# Patient Record
Sex: FEMALE | Race: WHITE | Employment: PART TIME | URBAN - METROPOLITAN AREA
[De-identification: names, ages, dates, MRNs, and addresses within clinical notes are randomized per-mention and may not be internally consistent; named-entity substitution may affect disease eponyms.]

---

## 2018-12-25 ENCOUNTER — HOSPITAL ENCOUNTER (EMERGENCY)
Facility: CLINIC | Age: 38
Discharge: ANOTHER HOSPITAL | End: 2018-12-26
Attending: EMERGENCY MEDICINE | Admitting: EMERGENCY MEDICINE
Payer: COMMERCIAL

## 2018-12-25 ENCOUNTER — APPOINTMENT (OUTPATIENT)
Dept: CT IMAGING | Facility: CLINIC | Age: 38
End: 2018-12-25
Attending: EMERGENCY MEDICINE
Payer: COMMERCIAL

## 2018-12-25 VITALS
DIASTOLIC BLOOD PRESSURE: 99 MMHG | WEIGHT: 194 LBS | TEMPERATURE: 97.9 F | HEART RATE: 94 BPM | SYSTOLIC BLOOD PRESSURE: 147 MMHG | OXYGEN SATURATION: 97 % | RESPIRATION RATE: 18 BRPM

## 2018-12-25 DIAGNOSIS — I77.71 CAROTID ARTERY DISSECTION (H): ICD-10-CM

## 2018-12-25 LAB
ANION GAP SERPL CALCULATED.3IONS-SCNC: 5 MMOL/L (ref 3–14)
B-HCG FREE SERPL-ACNC: <5 IU/L
BASOPHILS # BLD AUTO: 0.1 10E9/L (ref 0–0.2)
BASOPHILS NFR BLD AUTO: 0.7 %
BUN SERPL-MCNC: 19 MG/DL (ref 7–30)
CALCIUM SERPL-MCNC: 8.5 MG/DL (ref 8.5–10.1)
CHLORIDE SERPL-SCNC: 111 MMOL/L (ref 94–109)
CO2 SERPL-SCNC: 27 MMOL/L (ref 20–32)
COHGB MFR BLD: 1.1 % (ref 0–2)
CREAT SERPL-MCNC: 0.85 MG/DL (ref 0.52–1.04)
DIFFERENTIAL METHOD BLD: NORMAL
EOSINOPHIL # BLD AUTO: 0.1 10E9/L (ref 0–0.7)
EOSINOPHIL NFR BLD AUTO: 1.4 %
ERYTHROCYTE [DISTWIDTH] IN BLOOD BY AUTOMATED COUNT: 12.3 % (ref 10–15)
GFR SERPL CREATININE-BSD FRML MDRD: 85 ML/MIN/{1.73_M2}
GLUCOSE SERPL-MCNC: 94 MG/DL (ref 70–99)
HCT VFR BLD AUTO: 39.3 % (ref 35–47)
HGB BLD-MCNC: 12.6 G/DL (ref 11.7–15.7)
IMM GRANULOCYTES # BLD: 0 10E9/L (ref 0–0.4)
IMM GRANULOCYTES NFR BLD: 0.1 %
LYMPHOCYTES # BLD AUTO: 2.5 10E9/L (ref 0.8–5.3)
LYMPHOCYTES NFR BLD AUTO: 35 %
MCH RBC QN AUTO: 29.2 PG (ref 26.5–33)
MCHC RBC AUTO-ENTMCNC: 32.1 G/DL (ref 31.5–36.5)
MCV RBC AUTO: 91 FL (ref 78–100)
MONOCYTES # BLD AUTO: 0.7 10E9/L (ref 0–1.3)
MONOCYTES NFR BLD AUTO: 9.7 %
NEUTROPHILS # BLD AUTO: 3.7 10E9/L (ref 1.6–8.3)
NEUTROPHILS NFR BLD AUTO: 53.1 %
NRBC # BLD AUTO: 0 10*3/UL
NRBC BLD AUTO-RTO: 0 /100
PLATELET # BLD AUTO: 355 10E9/L (ref 150–450)
POTASSIUM SERPL-SCNC: 3.8 MMOL/L (ref 3.4–5.3)
RBC # BLD AUTO: 4.32 10E12/L (ref 3.8–5.2)
SODIUM SERPL-SCNC: 143 MMOL/L (ref 133–144)
WBC # BLD AUTO: 7 10E9/L (ref 4–11)

## 2018-12-25 PROCEDURE — 96375 TX/PRO/DX INJ NEW DRUG ADDON: CPT

## 2018-12-25 PROCEDURE — 85025 COMPLETE CBC W/AUTO DIFF WBC: CPT | Performed by: EMERGENCY MEDICINE

## 2018-12-25 PROCEDURE — 25000128 H RX IP 250 OP 636: Performed by: EMERGENCY MEDICINE

## 2018-12-25 PROCEDURE — 84702 CHORIONIC GONADOTROPIN TEST: CPT

## 2018-12-25 PROCEDURE — 82375 ASSAY CARBOXYHB QUANT: CPT | Performed by: EMERGENCY MEDICINE

## 2018-12-25 PROCEDURE — 96374 THER/PROPH/DIAG INJ IV PUSH: CPT | Mod: 59

## 2018-12-25 PROCEDURE — 70450 CT HEAD/BRAIN W/O DYE: CPT

## 2018-12-25 PROCEDURE — 70498 CT ANGIOGRAPHY NECK: CPT

## 2018-12-25 PROCEDURE — 25000132 ZZH RX MED GY IP 250 OP 250 PS 637: Performed by: EMERGENCY MEDICINE

## 2018-12-25 PROCEDURE — 96361 HYDRATE IV INFUSION ADD-ON: CPT

## 2018-12-25 PROCEDURE — 99285 EMERGENCY DEPT VISIT HI MDM: CPT | Mod: 25

## 2018-12-25 PROCEDURE — 36415 COLL VENOUS BLD VENIPUNCTURE: CPT | Performed by: EMERGENCY MEDICINE

## 2018-12-25 PROCEDURE — 80048 BASIC METABOLIC PNL TOTAL CA: CPT | Performed by: EMERGENCY MEDICINE

## 2018-12-25 RX ORDER — LEVOTHYROXINE SODIUM 100 UG/1
100 TABLET ORAL DAILY
Status: ON HOLD | COMMUNITY
End: 2018-12-26

## 2018-12-25 RX ORDER — IOPAMIDOL 755 MG/ML
500 INJECTION, SOLUTION INTRAVASCULAR ONCE
Status: COMPLETED | OUTPATIENT
Start: 2018-12-25 | End: 2018-12-25

## 2018-12-25 RX ORDER — DIPHENHYDRAMINE HYDROCHLORIDE 50 MG/ML
12.5 INJECTION INTRAMUSCULAR; INTRAVENOUS ONCE
Status: COMPLETED | OUTPATIENT
Start: 2018-12-25 | End: 2018-12-25

## 2018-12-25 RX ORDER — BUPROPION HYDROCHLORIDE 100 MG/1
100 TABLET ORAL 2 TIMES DAILY
Status: ON HOLD | COMMUNITY
End: 2018-12-26

## 2018-12-25 RX ORDER — HYDROCHLOROTHIAZIDE 12.5 MG/1
12.5 CAPSULE ORAL EVERY MORNING
COMMUNITY

## 2018-12-25 RX ORDER — METOCLOPRAMIDE HYDROCHLORIDE 5 MG/ML
10 INJECTION INTRAMUSCULAR; INTRAVENOUS ONCE
Status: COMPLETED | OUTPATIENT
Start: 2018-12-25 | End: 2018-12-25

## 2018-12-25 RX ORDER — CLOPIDOGREL BISULFATE 75 MG/1
300 TABLET ORAL ONCE
Status: COMPLETED | OUTPATIENT
Start: 2018-12-25 | End: 2018-12-25

## 2018-12-25 RX ORDER — SODIUM CHLORIDE 9 MG/ML
1000 INJECTION, SOLUTION INTRAVENOUS CONTINUOUS
Status: DISCONTINUED | OUTPATIENT
Start: 2018-12-25 | End: 2018-12-26 | Stop reason: HOSPADM

## 2018-12-25 RX ORDER — KETOROLAC TROMETHAMINE 30 MG/ML
30 INJECTION, SOLUTION INTRAMUSCULAR; INTRAVENOUS ONCE
Status: COMPLETED | OUTPATIENT
Start: 2018-12-25 | End: 2018-12-25

## 2018-12-25 RX ORDER — ASPIRIN 325 MG
325 TABLET ORAL ONCE
Status: COMPLETED | OUTPATIENT
Start: 2018-12-25 | End: 2018-12-25

## 2018-12-25 RX ADMIN — SODIUM CHLORIDE 1000 ML: 9 INJECTION, SOLUTION INTRAVENOUS at 21:59

## 2018-12-25 RX ADMIN — CLOPIDOGREL BISULFATE 300 MG: 75 TABLET, FILM COATED ORAL at 23:00

## 2018-12-25 RX ADMIN — IOPAMIDOL 70 ML: 755 INJECTION, SOLUTION INTRAVENOUS at 22:08

## 2018-12-25 RX ADMIN — SODIUM CHLORIDE 100 ML: 9 INJECTION, SOLUTION INTRAVENOUS at 22:08

## 2018-12-25 RX ADMIN — DIPHENHYDRAMINE HYDROCHLORIDE 12.5 MG: 50 INJECTION INTRAMUSCULAR; INTRAVENOUS at 22:00

## 2018-12-25 RX ADMIN — METOCLOPRAMIDE 10 MG: 5 INJECTION, SOLUTION INTRAMUSCULAR; INTRAVENOUS at 22:00

## 2018-12-25 RX ADMIN — ASPIRIN 325 MG ORAL TABLET 325 MG: 325 PILL ORAL at 23:00

## 2018-12-25 RX ADMIN — KETOROLAC TROMETHAMINE 30 MG: 30 INJECTION, SOLUTION INTRAMUSCULAR at 21:59

## 2018-12-25 ASSESSMENT — ENCOUNTER SYMPTOMS
CHILLS: 0
NUMBNESS: 0
HEADACHES: 1
FEVER: 0
SPEECH DIFFICULTY: 0
FACIAL ASYMMETRY: 1
WEAKNESS: 0

## 2018-12-26 ENCOUNTER — APPOINTMENT (OUTPATIENT)
Dept: MRI IMAGING | Facility: CLINIC | Age: 38
DRG: 301 | End: 2018-12-26
Attending: INTERNAL MEDICINE
Payer: COMMERCIAL

## 2018-12-26 ENCOUNTER — HOSPITAL ENCOUNTER (INPATIENT)
Facility: CLINIC | Age: 38
LOS: 1 days | Discharge: HOME | DRG: 301 | End: 2018-12-27
Attending: INTERNAL MEDICINE | Admitting: INTERNAL MEDICINE
Payer: COMMERCIAL

## 2018-12-26 DIAGNOSIS — I77.71 CAROTID ARTERY DISSECTION (H): Primary | ICD-10-CM

## 2018-12-26 PROCEDURE — 25000128 H RX IP 250 OP 636: Performed by: INTERNAL MEDICINE

## 2018-12-26 PROCEDURE — 25500064 ZZH RX 255 OP 636: Performed by: INTERNAL MEDICINE

## 2018-12-26 PROCEDURE — 99221 1ST HOSP IP/OBS SF/LOW 40: CPT | Performed by: PSYCHIATRY & NEUROLOGY

## 2018-12-26 PROCEDURE — 12000000 ZZH R&B MED SURG/OB

## 2018-12-26 PROCEDURE — 25000132 ZZH RX MED GY IP 250 OP 250 PS 637: Performed by: INTERNAL MEDICINE

## 2018-12-26 PROCEDURE — 70553 MRI BRAIN STEM W/O & W/DYE: CPT

## 2018-12-26 PROCEDURE — A9585 GADOBUTROL INJECTION: HCPCS | Performed by: INTERNAL MEDICINE

## 2018-12-26 PROCEDURE — 99207 ZZC APP CREDIT; MD BILLING SHARED VISIT: CPT | Performed by: INTERNAL MEDICINE

## 2018-12-26 PROCEDURE — 99222 1ST HOSP IP/OBS MODERATE 55: CPT | Mod: AI | Performed by: INTERNAL MEDICINE

## 2018-12-26 RX ORDER — LEVOTHYROXINE SODIUM 100 UG/1
100 TABLET ORAL DAILY
Status: DISCONTINUED | OUTPATIENT
Start: 2018-12-26 | End: 2018-12-26

## 2018-12-26 RX ORDER — AMOXICILLIN 250 MG
1 CAPSULE ORAL 2 TIMES DAILY PRN
Status: DISCONTINUED | OUTPATIENT
Start: 2018-12-26 | End: 2018-12-27 | Stop reason: HOSPADM

## 2018-12-26 RX ORDER — ONDANSETRON 4 MG/1
4 TABLET, ORALLY DISINTEGRATING ORAL EVERY 6 HOURS PRN
Status: DISCONTINUED | OUTPATIENT
Start: 2018-12-26 | End: 2018-12-27 | Stop reason: HOSPADM

## 2018-12-26 RX ORDER — CLOPIDOGREL BISULFATE 75 MG/1
75 TABLET ORAL DAILY
Status: DISCONTINUED | OUTPATIENT
Start: 2018-12-26 | End: 2018-12-27 | Stop reason: HOSPADM

## 2018-12-26 RX ORDER — BUPROPION HYDROCHLORIDE 100 MG/1
100 TABLET ORAL 2 TIMES DAILY
Status: DISCONTINUED | OUTPATIENT
Start: 2018-12-26 | End: 2018-12-26

## 2018-12-26 RX ORDER — AMOXICILLIN 250 MG
2 CAPSULE ORAL 2 TIMES DAILY PRN
Status: DISCONTINUED | OUTPATIENT
Start: 2018-12-26 | End: 2018-12-27 | Stop reason: HOSPADM

## 2018-12-26 RX ORDER — BUPROPION HYDROCHLORIDE 100 MG/1
100 TABLET, EXTENDED RELEASE ORAL 2 TIMES DAILY
Status: DISCONTINUED | OUTPATIENT
Start: 2018-12-26 | End: 2018-12-27 | Stop reason: HOSPADM

## 2018-12-26 RX ORDER — HYDROCHLOROTHIAZIDE 12.5 MG/1
12.5 CAPSULE ORAL DAILY
Status: DISCONTINUED | OUTPATIENT
Start: 2018-12-26 | End: 2018-12-27 | Stop reason: HOSPADM

## 2018-12-26 RX ORDER — ONDANSETRON 2 MG/ML
4 INJECTION INTRAMUSCULAR; INTRAVENOUS EVERY 6 HOURS PRN
Status: DISCONTINUED | OUTPATIENT
Start: 2018-12-26 | End: 2018-12-27 | Stop reason: HOSPADM

## 2018-12-26 RX ORDER — LEVOTHYROXINE SODIUM 100 UG/1
100 TABLET ORAL ONCE
Status: COMPLETED | OUTPATIENT
Start: 2018-12-26 | End: 2018-12-26

## 2018-12-26 RX ORDER — ASPIRIN 325 MG
325 TABLET ORAL DAILY
Status: DISCONTINUED | OUTPATIENT
Start: 2018-12-26 | End: 2018-12-27 | Stop reason: HOSPADM

## 2018-12-26 RX ORDER — HYDRALAZINE HYDROCHLORIDE 20 MG/ML
10 INJECTION INTRAMUSCULAR; INTRAVENOUS EVERY 4 HOURS PRN
Status: DISCONTINUED | OUTPATIENT
Start: 2018-12-26 | End: 2018-12-26

## 2018-12-26 RX ORDER — BUPROPION HYDROCHLORIDE 100 MG/1
100 TABLET, EXTENDED RELEASE ORAL 2 TIMES DAILY
COMMUNITY

## 2018-12-26 RX ORDER — ACETAMINOPHEN 325 MG/1
650 TABLET ORAL EVERY 4 HOURS PRN
Status: DISCONTINUED | OUTPATIENT
Start: 2018-12-26 | End: 2018-12-27 | Stop reason: HOSPADM

## 2018-12-26 RX ORDER — HYDROCODONE BITARTRATE AND ACETAMINOPHEN 5; 325 MG/1; MG/1
1-2 TABLET ORAL EVERY 4 HOURS PRN
Status: DISCONTINUED | OUTPATIENT
Start: 2018-12-26 | End: 2018-12-27 | Stop reason: HOSPADM

## 2018-12-26 RX ORDER — GADOBUTROL 604.72 MG/ML
9 INJECTION INTRAVENOUS ONCE
Status: COMPLETED | OUTPATIENT
Start: 2018-12-26 | End: 2018-12-26

## 2018-12-26 RX ORDER — LEVOTHYROXINE SODIUM 175 UG/1
175 TABLET ORAL
COMMUNITY

## 2018-12-26 RX ORDER — SODIUM CHLORIDE 9 MG/ML
INJECTION, SOLUTION INTRAVENOUS CONTINUOUS
Status: DISCONTINUED | OUTPATIENT
Start: 2018-12-26 | End: 2018-12-27 | Stop reason: HOSPADM

## 2018-12-26 RX ORDER — LEVOTHYROXINE SODIUM 100 UG/1
200 TABLET ORAL
Status: DISCONTINUED | OUTPATIENT
Start: 2018-12-28 | End: 2018-12-27 | Stop reason: HOSPADM

## 2018-12-26 RX ORDER — HYDRALAZINE HYDROCHLORIDE 20 MG/ML
10 INJECTION INTRAMUSCULAR; INTRAVENOUS EVERY 4 HOURS PRN
Status: DISCONTINUED | OUTPATIENT
Start: 2018-12-26 | End: 2018-12-27 | Stop reason: HOSPADM

## 2018-12-26 RX ORDER — LORAZEPAM 0.5 MG/1
.5-1 TABLET ORAL EVERY 4 HOURS PRN
Status: DISCONTINUED | OUTPATIENT
Start: 2018-12-26 | End: 2018-12-27 | Stop reason: HOSPADM

## 2018-12-26 RX ORDER — LEVOTHYROXINE SODIUM 200 UG/1
200 TABLET ORAL
COMMUNITY

## 2018-12-26 RX ORDER — NALOXONE HYDROCHLORIDE 0.4 MG/ML
.1-.4 INJECTION, SOLUTION INTRAMUSCULAR; INTRAVENOUS; SUBCUTANEOUS
Status: DISCONTINUED | OUTPATIENT
Start: 2018-12-26 | End: 2018-12-27 | Stop reason: HOSPADM

## 2018-12-26 RX ADMIN — BUPROPION HYDROCHLORIDE 100 MG: 100 TABLET, FILM COATED, EXTENDED RELEASE ORAL at 20:42

## 2018-12-26 RX ADMIN — SODIUM CHLORIDE: 9 INJECTION, SOLUTION INTRAVENOUS at 02:57

## 2018-12-26 RX ADMIN — BUPROPION HYDROCHLORIDE 100 MG: 100 TABLET, FILM COATED ORAL at 09:11

## 2018-12-26 RX ADMIN — ASPIRIN 325 MG ORAL TABLET 325 MG: 325 PILL ORAL at 09:11

## 2018-12-26 RX ADMIN — HYDROCHLOROTHIAZIDE 12.5 MG: 12.5 CAPSULE ORAL at 09:11

## 2018-12-26 RX ADMIN — LEVOTHYROXINE SODIUM 100 MCG: 100 TABLET ORAL at 11:56

## 2018-12-26 RX ADMIN — LEVOTHYROXINE SODIUM 100 MCG: 100 TABLET ORAL at 09:11

## 2018-12-26 RX ADMIN — SODIUM CHLORIDE: 9 INJECTION, SOLUTION INTRAVENOUS at 20:43

## 2018-12-26 RX ADMIN — LORAZEPAM 1 MG: 0.5 TABLET ORAL at 04:21

## 2018-12-26 RX ADMIN — CLOPIDOGREL BISULFATE 75 MG: 75 TABLET, FILM COATED ORAL at 09:11

## 2018-12-26 RX ADMIN — GADOBUTROL 9 ML: 604.72 INJECTION INTRAVENOUS at 05:42

## 2018-12-26 ASSESSMENT — ACTIVITIES OF DAILY LIVING (ADL)
TOILETING: 0-->INDEPENDENT
RETIRED_COMMUNICATION: 0-->UNDERSTANDS/COMMUNICATES WITHOUT DIFFICULTY
ADLS_ACUITY_SCORE: 8
SWALLOWING: 0-->SWALLOWS FOODS/LIQUIDS WITHOUT DIFFICULTY
AMBULATION: 0-->INDEPENDENT
COGNITION: 0 - NO COGNITION ISSUES REPORTED
TRANSFERRING: 0-->INDEPENDENT
ADLS_ACUITY_SCORE: 8
RETIRED_EATING: 0-->INDEPENDENT
ADLS_ACUITY_SCORE: 8
DRESS: 0-->INDEPENDENT
FALL_HISTORY_WITHIN_LAST_SIX_MONTHS: NO
BATHING: 0-->INDEPENDENT
ADLS_ACUITY_SCORE: 8
ADLS_ACUITY_SCORE: 8

## 2018-12-26 NOTE — PLAN OF CARE
A&Ox4. Neuros intact, except slight L eye droop and R tongue deviation. VSS. Regular diet. Voiding adequately. Up with SBA. Denies pain or headache. Plan to discharge to home tomorrow if pt remains stable, nursing will continue to monitor.

## 2018-12-26 NOTE — PROVIDER NOTIFICATION
"Neurology fellow paged, \"Is pt okay to have diet orders? No interventions at this time? Thank you.\"    ADDENDUM: Neurology called back, then Dr. Hoover paged, \"Spoke w/ neurology. Pt okay to have diet orders, can you place whichever you prefer she have? They want to watch her one more night then likely discharge tomorrow morning if she stays stable.\"  "

## 2018-12-26 NOTE — PROGRESS NOTES
Pt seen and examined. Seen by my colleague Dr. Ramos early in the morning. H/P, labs, vital signs and orders reviewed. Agree with his A/P.  Patient presenting with headache and left eye ptosis and miosis, was eventually found to have left ICA dissection.    Headache much improved since admission.  No new neurological change.  Patient seen by stroke neurology, recommend continuing dual antiplatelet agents with aspirin 325 mg daily and Plavix 75 mg daily  Every 4 neurochecks  Monitor for next 24 hours if stable likely discharge home tomorrow.  Reassess in AM

## 2018-12-26 NOTE — ED PROVIDER NOTES
History     Chief Complaint:  Headache    HPI   Juli Davis is a 38 year old female with a history of hypertension, controlled with medications who presents with a headache and droopy eyelid. The patient states she first noticed a slight headache on Thursday, 5 days ago, which she attributed to stress as she is planning a trip to [place] for six months coming up here. She describes the headache as pressure-like along the left base of her head and into her left ear. She reports the headache persisted throughout the weekend, but seemed to be eased by Advil. However, on Sunday, 2 days ago, the patient states she arrived in [place] from [place] where she lives for the holidays and her mother commented on her left eye drooping slightly. The patient reports she did not notice it much at that time, but started to notice the droopy left eye yesterday as it was more pronounced. Today, the patient states her headache was totally alleviated by Advil until later this evening when it returned. She also noted her eyelid to be more droopy than previously, so she called her primary care physician in [place] who suggested she be seen in an ED, prompting her visit this evening. Here in the ED, she notes some pain radiating into her left ear from the headache, but denies any numbness, weakness, speech or gait difficulty, visual changes, significant ear pain, tinnitus, or other acute symptoms. She denies any history of migraines.    Allergies:  Cephalexin  Macrobid  Penicillins    Medications:    Wellbutrin  Hydrochlorothiazide  Levothyroxine    Past Medical History:    Hypothyroidism  Hypertension  Depression    Past Surgical History:    Nasal surgery  Dental surgery  Thyroidectomy    Family History:    Hypertension  Multiple food allergies    Social History:  Smoking status: No  Alcohol use: Yes, socially  Drug use: No  Patient lives in [place], [place]  Presents to the ED with her spouse  Marital Status:      Review of Systems   Constitutional: Negative for chills and fever.   HENT: Negative for ear pain and tinnitus.    Eyes: Negative for visual disturbance.   Musculoskeletal: Negative for gait problem.   Neurological: Positive for facial asymmetry (Left ptosis) and headaches. Negative for speech difficulty, weakness and numbness.   All other systems reviewed and are negative.    Physical Exam     Patient Vitals for the past 24 hrs:   BP Temp Temp src Pulse Heart Rate Resp SpO2 Weight   12/25/18 2130 (!) 153/103 -- -- 85 -- -- -- --   12/25/18 2115 -- -- -- -- -- -- 98 % --   12/25/18 2100 (!) 141/98 -- -- 81 -- -- 98 % --   12/25/18 2016 (!) 170/115 97.9  F (36.6  C) Temporal -- 93 18 100 % 88 kg (194 lb 0.1 oz)     Physical Exam  Constitutional:  Appears well-developed and well-nourished. Alert. Conversant. Non toxic.  HENT:   Head: Atraumatic. No depressed skull fracture, Racoon Eyes, Carpio's sign, or hemotympanum. Face normal. TMs normal  Nose: Nose normal.  Mouth/Throat: Oral mucosa is clear and moist. no trismus. Pharynx normal. Tonsils symmetric. No tonsillar enlargement, erythema, or exudate.  Eyes: Conjunctivae normal. EOM normal. Pupils equal, round, and reactive to light. No scleral icterus.   Neck: Normal range of motion. Neck supple. No tracheal deviation present. But no carotid bruit.  Cardiovascular: Normal rate, regular rhythm. No gallop. No friction rub. No murmur heard. Symmetric radial artery pulses   Pulmonary/Chest: Effort normal. No stridor. No respiratory distress. No wheezes. No rales. No rhonchi . No tenderness.   Abdominal: Soft. Bowel sounds normal. No distension. No mass. No tenderness. No rebound. No guarding.   Musculoskeletal:   RUE: Normal range of motion. No tenderness. No deformity  LUE: Normal range of motion. No tenderness. No deformity  RLE: Normal range of motion. No edema. No tenderness. No deformity  LLE: Normal range of motion. No edema. No tenderness. No  deformity  Lymph: No cervical adenopathy.   Neurological: Alert and oriented to person, place, and time. Mental status normal. Attention normal.  Alert and oriented x3.  GCS 15. Memory normal. Speech fluent. Cognition normal.  Facial sensation and muscles of facial expression are normal save for subtle left ptosis.  Tongue does deviate slightly to the right when it protrudes.  By my exam palate elevates symmetrically and uvula is in the midline.  Normal phonation.  EOMI. Palate elevates symmetrically and tongue protrudes in the midline.    Strength:   5/5 bilaterally in the trapezius, 5/5 bilaterally in the deltoid, 5/5 bilaterally in the biceps, 5/5 bilaterally in the triceps, 5/5 bilaterally in thegrip, 5/5 bilaterally thumb opposition, 5/5 bilaterally finger abduction  5/5 bilaterally in the psoas, 5/5 bilaterally in the quadriceps, 5/5 bilaterally in the hamstring, 5/5 bilaterally in the gastrocnemius, 5/5 bilaterally in the tibialis anterior    Sensation intact to light touch in both upper extremities (C4-T1)  Sensation intact to light touch in Both lower extremities (L4-S1).     Finger to nose and coordination normal. Gait normal.   Skin: Skin is warm and dry. No rash noted. No pallor. Normal capillary refill.  Psychiatric:  Normal mood. Normal affect.     Emergency Department Course   Imaging:  Radiographic findings were communicated with the patient who voiced understanding of the findings.    Head CT w/o contrast:  Normal CT scan of the head.   As read by Radiology.    CT Head Neck Angio w/o & w Contrast:  Left distal cervical internal carotid artery dissection  with 80-90% short segment stenosis.  As read by Radiology.    Laboratory:  CBC: WNL (WBC 7.0, HGB 12.6, )  BMP: Chloride 111 (H), o/w WNL (Creatinine 0.85)  Carbon monoxide: 1.1  ISTAT hCG quant: <5.0    Interventions:  2159: NS 1L IV Bolus  2159: 30 mg Toradol IV  2200: 12.5 mg Benadryl IV  2200: 10 mg Reglan IV  2300: 325 mg Aspirin  PO  2300: 300 mg Plavix PO    Emergency Department Course:  Past medical records, nursing notes, and vitals reviewed.  2101: I performed an exam of the patient and obtained history, as documented above.  IV inserted and blood drawn.  The patient was sent for a head CT and head/neck CTA while in the emergency department, findings above.    2235: I spoke with Dr. Sparks on for radiology regarding the CT findings.    2239: I spoke to Dr. Cole on-call for stroke neurology.     2244: I rechecked the patient. Explained findings to the patient.    2258: I spoke to Dr. Ramos of the hospitalist service who accepts the patient for admission.     Findings and plan explained to the patient. Patient will be transferred to [facility] via EMS. Discussed the case with Dr. Ramos, who will admit the patient to a monitored bed for further monitoring, evaluation, and treatment.     Impression & Plan    Medical Decision Making:  Juli Davis is a 38 year old female who presents to the ED for evaluation of headache for the past 4-5 days associated with left eye ptosis that was first noticed 2 days ago by her mother but is more prominent yesterday and today.  On my exam in addition to those symptoms she also had very subtle left eye miosis.  With a potential left Ismael syndrome and a new headache syndrome, I was concerned about carotid artery dissection.  We sent the patient for head CT which was fortunately negative for hemorrhage, mass or other abnormality and for CTA of her head and neck which did confirm a left carotid dissection with an 80-90% stenosis of a short segment of her distal ICA.  Other than a very subtle tongue deviation there is no other obvious stroke symptoms, no a aphasia or right hemiparesis.  Discussed with the stroke neurologist who recommended dual antiplatelet therapy with aspirin and Plavix (300 mg initial loading dose).  No recent unusual bleeding or other risk factors for hemorrhage.   Initial antiplatelet medications given while here in the ER.  He also recommends MRI of the brain and 24-hour observation.  If she were to develop stroke symptoms she may be a candidate for intra-arterial intervention.  Therefore will transfer to [facility] where the stroke service and the neuro interventionalists are located to expedite care should become necessary.    Discussed the lab and CT findings, the diagnosis, the recommended treatment plan by the stroke neurologists with the patient and her .  They are both in full agreement.  I then discussed with the hospitalist service at [facility] who accepted the patient in transfer to the neuro unit.  She will transfer by EMS.    Critical Care time: none    Diagnosis:    ICD-10-CM   1. Carotid artery dissection (H) I77.71     Disposition: Transferred and admitted to [facility]    Nivia Camacho  12/25/2018   [facility] EMERGENCY DEPARTMENT    Nivia CARMICHAEL, am serving as a scribe at 9:01 PM on 12/25/2018 to document services personally performed by Bandar Moe MD based on my observations and the provider's statements to me.      Bandar Moe MD  12/25/18 [id number]

## 2018-12-26 NOTE — PLAN OF CARE
A&O X4. Neuros intact ex L eye droop, slight r tongue deviation. VSS. Passed bedside swallow- oral ativan given prior to MRI. Pt NPO ex meds at this time until further eval. Up SBA. Denies headache/pain. Discharge plan pending.

## 2018-12-26 NOTE — CONSULTS
[facility], [facility]    Vascular Neurology consult    Name: Juli Davis  YOB: 1980  MRN: [id number]  Today's date: 12/26/2018  Source of information: Patient, patient's  at bedside and chart review    Reason for consult:  Left ICA dissection    Chief Complaint:  Headache for 5 days, left eye ptosis.    History of Present Illness:    Juli Davis is a 38 year old female with a PMH significant for hypertension, hypothyroidism, depression who presented to the ED for further evaluation of a 5-day history of headaches and left eye ptosis.  Approximately 5 days ago, patient noted severe left-sided headache which she initially attributed to stress.  She denies any prior trauma, no chiropractic manipulation, no sudden strenuous activity prior to this.  She was taking over-the-counter medications with minimal relief.  When she was with her family for [place] holiday, 1 of her family members noted left eye ptosis and she presented to the ED for further evaluation.  On arrival, she had a CT head without contrast which was a normal study, she had a CT angiogram head and neck which showed left distal cervical ICA dissection with 80-90% short segment stenosis.  She was then admitted for further workup and was started on aspirin and Plavix.  MRI brain did not show any evidence of an acute infarct.  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  No recent fevers or chills, no chest pain or shortness of breath, no abdominal pain nausea vomiting or diarrhea, no strenuous lifting or activity.  Currently denies any headaches.    The patient's medical, surgical, social, and family history were personally reviewed with the patient.  No past medical history on file.   Past Surgical History:   Procedure Laterality Date     NONSPECIFIC PROCEDURE      NASAL SURG AS A CHILD     NONSPECIFIC PROCEDURE      DENTAL SURG     Social History      Socioeconomic History     Marital status:      Spouse name: Not on file     Number of children: Not on file     Years of education: Not on file     Highest education level: Not on file   Social Needs     Financial resource strain: Not on file     Food insecurity - worry: Not on file     Food insecurity - inability: Not on file     Transportation needs - medical: Not on file     Transportation needs - non-medical: Not on file   Occupational History     Not on file   Tobacco Use     Smoking status: Never Smoker   Substance and Sexual Activity     Alcohol use: Yes     Comment: rare socially     Drug use: No     Sexual activity: Not on file   Other Topics Concern     Not on file   Social History Narrative     Not on file     Family History   Problem Relation Age of Onset     Hypertension Father      Allergies Mother         to many foods     Current Facility-Administered Medications   Medication     acetaminophen (TYLENOL) tablet 650 mg     aspirin (ASA) tablet 325 mg     buPROPion (WELLBUTRIN SR) 12 hr tablet 100 mg     clopidogrel (PLAVIX) tablet 75 mg     hydrALAZINE (APRESOLINE) injection 10 mg     hydrochlorothiazide (MICROZIDE) capsule 12.5 mg     HYDROcodone-acetaminophen (NORCO) 5-325 MG per tablet 1-2 tablet     levothyroxine (SYNTHROID/LEVOTHROID) tablet 100 mcg     [START ON 12/27/2018] levothyroxine (SYNTHROID/LEVOTHROID) tablet 175 mcg     [START ON 12/28/2018] levothyroxine (SYNTHROID/LEVOTHROID) tablet 200 mcg     LORazepam (ATIVAN) tablet 0.5-1 mg     magnesium hydroxide (MILK OF MAGNESIA) suspension 30 mL     melatonin tablet 1 mg     naloxone (NARCAN) injection 0.1-0.4 mg     ondansetron (ZOFRAN-ODT) ODT tab 4 mg    Or     ondansetron (ZOFRAN) injection 4 mg     senna-docusate (SENOKOT-S/PERICOLACE) 8.6-50 MG per tablet 1 tablet    Or     senna-docusate (SENOKOT-S/PERICOLACE) 8.6-50 MG per tablet 2 tablet     sodium chloride 0.9% infusion     Allergies   Allergen Reactions     Cephalexin       Macrobid [Nitrofurantoin]      Penicillins      Review of Systems:  14-point review of systems was completed. The pertinent positives and negatives are in the HPI, and the remainder was negative unless otherwise mentioned.    Physical Exam:  /83   Pulse 86   Temp 98.4  F (36.9  C) (Oral)   Resp 16   SpO2 99%    General:  Pleasant. Dressed appropriately for season. Resting comfortably in bed.   Head:  Normocephalic, atraumatic  Eyes:  No conjunctival injection, no scleral icterus.   Mouth:  No oral lesions, no erythema or exudate in the oropharynx  Respiratory:  Non-labored breathing on room air. No accessory muscle use.  Cardiovascular:  Peripheral pulses intact. No carotid bruits.  Abdomen: Soft  Extremities:  Warm, dry without peripheral edema  Neurologic:    Mental Status Exam:  Alert, awake. Fully oriented. Provides a thorough history. Speech of normal fluency.  Cranial Nerves:  EOMs intact, no nystagmus, left eye ptosis and myosis was noted, otherwise face is symmetric, facial sensation intact to light touch, hearing intact to conversation, palate and uvula rise symmetrically, no deviation in uvula or tongue, symmetric shoulder shrug, tongue midline and fully mobile.   Motor:  Normal tone in all four extremities, no atrophy or fasciculations were seen. 5/5 strength bilaterally in shoulder abduction, elbow extensors and flexors, wrist extension, finger abduction, hip flexors, knee extensors and flexors. No tremors.  Sensory:  Sensation intact to light touch on arms and legs bilaterally. Negative Romberg.  Coordination:  Finger-nose-finger without dysmetria bilaterally. Finger tapping regular and rhythmic bilaterally.   Reflexes:  2+ and symmetric  Gait: Deferred     National [employer] Stroke Scale  Exam Interval: Baseline   Score    Level of consciousness: (0)   Alert, keenly responsive    LOC questions: (0)   Answers both questions correctly    LOC commands: (0)   Performs both tasks  correctly    Best gaze: (0)   Normal    Visual: (0)   No visual loss    Facial palsy: (0)   Normal symmetrical movements    Motor arm (left): (0)   No drift    Motor arm (right): (0)   No drift    Motor leg (left): (0)   No drift    Motor leg (right): (0)   No drift    Limb ataxia: (0)   Absent    Sensory: (0)   Normal- no sensory loss    Best language: (0)   Normal- no aphasia    Dysarthria: (0)   Normal    Extinction and inattention: (0)   No abnormality        Total Score:  0     laboratory:  Lab Results   Component Value Date    WBC 7.0 12/25/2018    HGB 12.6 12/25/2018    HCT 39.3 12/25/2018     12/25/2018     12/25/2018    POTASSIUM 3.8 12/25/2018    CHLORIDE 111 (H) 12/25/2018    CO2 27 12/25/2018    BUN 19 12/25/2018    CR 0.85 12/25/2018    GLC 94 12/25/2018    AST 28 04/22/2003    ALT 14 04/22/2003    ALKPHOS 55 04/22/2003    BILITOTAL 0.6 04/22/2003     Imaging:  CT head-no acute intracranial abnormality  CT angiogram head and neck- left distal ICA dissection with 80-90% stenosis  MRI brain-no evidence of an acute infarct    Assessment/Plan:  Juli Davis is a 38 year old female with a past medical history of hypertension who presented with a 5-day history of headaches and left eye ptosis and myosis.  She was noted to have a distal left ICA dissection with 80-90% stenosis.  Unclear etiology of her dissection at this time.  No clear history of trauma or neck manipulation.    #Left ICA dissection with 80-90% stenosis  -Continue dual antiplatelets: Aspirin 325 mg + Plavix 75 mg daily  -Q4 neuro checks  -She will need repeat imaging: CT angiogram head and neck in 4 weeks.  -Patient will be out of the country for the next 6 months, recommended that she get close follow-up with repeat imaging and a neurology consult.  -Would recommend to continue dual antiplatelets until follow-up imaging to decide regarding duration of medications.  -Stroke education  -Okay for long distance travel, we did  discuss regarding avoiding strenuous activity or neck manipulation    #Hypertension  -Long-term blood pressure less than 140/90    #Depression  -Continue Wellbutrin 100 twice daily    DVT prophylaxis: SCDs, subcu heparin  Disposition: Monitor for another 24 hours, possible discharge to home on 12/27    Plan was discussed in detail with patient and patient's  at bedside who expressed understanding. We will continue to follow along closely, please call us with any questions or concerns. Case was seen and discussed with Dr. Kelsey Oliveros MD  Vascular Neurology fellow  Pager # [phone]

## 2018-12-26 NOTE — PROVIDER NOTIFICATION
"Paged Dr. Ramos, \"Wondering if you want MRI ordered for pt w/ Ativan? Also, wondering about diet order- Current order NPO.\"  "

## 2018-12-26 NOTE — ED TRIAGE NOTES
Headache since Thursday, on Sunday night headache got a little better but now eyelid droop on lt eye.  No other neuro deficits, still mild headache.

## 2018-12-26 NOTE — PHARMACY-ADMISSION MEDICATION HISTORY
Admission medication history interview status for the 12/26/2018  admission is complete. See EPIC admission navigator for prior to admission medications     Medication history source reliability:Good    Actions taken by pharmacist (provider contacted, etc):None     Additional medication history information not noted on PTA med list :None    Medication reconciliation/reorder completed by provider prior to medication history? No    Time spent in this activity: 15 min    Prior to Admission medications    Medication Sig Last Dose Taking? Auth Provider   buPROPion (WELLBUTRIN SR) 100 MG 12 hr tablet Take 100 mg by mouth 2 times daily 12/25/2018 at am Yes Unknown, Entered By History   levothyroxine (SYNTHROID/LEVOTHROID) 175 MCG tablet Take 175 mcg by mouth three times a week Tu / Th / Sa 12/25/2018 at am Yes Unknown, Entered By History   levothyroxine (SYNTHROID/LEVOTHROID) 200 MCG tablet Take 200 mcg by mouth four times a week Mon, Wed, Fri, [place] 12/24/2018 at am Yes Unknown, Entered By History   hydrochlorothiazide (MICROZIDE) 12.5 MG capsule Take 12.5 mg by mouth every morning  12/24/2018 at pm  Reported, Patient

## 2018-12-26 NOTE — H&P
[facility]    History and Physical  Hospitalist       Date of Admission:  12/26/2018  Date of Service (when I saw the patient): 12/26/18    Assessment & Plan   Juli Davis is a 38 year old female who presents with L sided headache and ptosis    L internal carotid artery dissection  Presented after having several days of headache on L as well as ptosis. In ED imaging revealed L internal carotid artery dissection with 80-90% short segment stenosis. Neuro exam intact with exception of L eyelid ptosis  - Neurology consulted/ aware  - q4 hour neuro checks  - MRI/MRA brain  - plavix loaded, continue plavix, ASA    Hypertension  PTA on hydrochlorothiazide  - continue HCTZ  - prn hydralazine for SBP >170     Hypothyroidism  Continue pta levothyroxine    Depression  Normally on bupropion 100 mg BID, hold until seen by neurology    Pain plan: # Pain Assessment:  Current Pain Score 12/26/2018   Patient currently in pain? denies   Pain score (0-10) -   Juli giron pain level was assessed and she currently denies pain.      DVT Prophylaxis: Pneumatic Compression Devices and Ambulate every shift  Code Status: Full Code    Disposition: Expected discharge in 1-2 days pending neurology input    Reece Ramos MD  [phone] (P)  Text Page     Primary Care Physician   In [place], not local    Chief Complaint   L ptosis, L sided headache    History is obtained from the patient and medical records    History of Present Illness   Juli Davis is a 38 year old female who presents with the above. With a history of hypertension, depression and hypothyroidism. States that ~5 days prior to admission with L sided headache, which she attributed to stress (she and her  are going to be in [place] for 6 months). In [place] for Christmas holiday, and family member noted L ptosis. Given ongoing headache and ptosis, she presented to the ED for evaluation. Denies f/c. No cp/sob. No other focal neurologic  deficits. No GI or  sx    In ED noted to have L ptosis, ? L eye miosis as well.  Found on CT imaging to have L internal carotid dissection with 80-90% stenosis. Mildly hypertensive to 140-150 systolic. Labs otherwise normal    Past Medical History    I have reviewed this patient's medical history and updated it with pertinent information if needed.   No past medical history on file.    Past Surgical History   I have reviewed this patient's surgical history and updated it with pertinent information if needed.  Past Surgical History:   Procedure Laterality Date     NONSPECIFIC PROCEDURE      NASAL SURG AS A CHILD     NONSPECIFIC PROCEDURE      DENTAL SURG       Prior to Admission Medications   Prior to Admission Medications   Prescriptions Last Dose Informant Patient Reported? Taking?   buPROPion (WELLBUTRIN) 100 MG tablet   Yes No   Sig: Take 100 mg by mouth 2 times daily   hydrochlorothiazide (MICROZIDE) 12.5 MG capsule   Yes No   Sig: Take 12.5 mg by mouth daily   levothyroxine (SYNTHROID/LEVOTHROID) 100 MCG tablet   Yes No   Sig: Take 100 mcg by mouth daily      Facility-Administered Medications: None     Allergies   Allergies   Allergen Reactions     Cephalexin      Macrobid [Nitrofurantoin]      Penicillins        Social History   I have reviewed this patient's social history and updated it with pertinent information if needed. Juli Davis  reports that  has never smoked. She does not have any smokeless tobacco history on file. She reports that she drinks alcohol. She reports that she does not use drugs.    Family History   I have reviewed this patient's family history and updated it with pertinent information if needed.   Family History   Problem Relation Age of Onset     Hypertension Father      Allergies Mother         to many foods       Review of Systems   The 10 point Review of Systems is negative other than noted in the HPI or here.     Physical Exam   Temp: 98.4  F (36.9  C) Temp src: Oral BP:  (!) 147/99   Heart Rate: 86 Resp: 16 SpO2: 99 % O2 Device: None (Room air)    Vital Signs with Ranges  0 lbs 0 oz    Constitutional: alert, oriented and in no acute distress  Eyes: EOMI, PERRL, L upper eyelid ptosis  HEENT: OP clear  Respiratory: CTA B without w/c  Cardiovascular: RRR without m/r/g  GI: soft, nontender, nondistended, no HSM  Lymph/Hematologic: no cervical LAD  Genitourinary: deferred  Skin: no rashes or lesions grossly  Musculoskeletal: no deformities or arthritis  Neurologic: CN II-XII, RONQUILLO, sensation grossly intact  Psychiatric: mood and affect wnl    Data   Data reviewed today:  I personally reviewed no images or EKG's today.  Recent Labs   Lab 12/25/18  2135   WBC 7.0   HGB 12.6   MCV 91         POTASSIUM 3.8   CHLORIDE 111*   CO2 27   BUN 19   CR 0.85   ANIONGAP 5   PANDA 8.5   GLC 94       Recent Results (from the past 24 hour(s))   CT Head Neck Angio w/o & w Contrast    Narrative    CTA HEAD/NECK WITH CONTRAST December 25, 2018 10:18 PM     HISTORY: Headache, sudden, carotid/vertebral dissection suspected.  Neuro deficit(s), subacute; left ptosis, left headache.    TECHNIQUE: Axial images were obtained through the head and neck  without and with intravenous contrast. 70 mL of Isovue-370 was given.  Multiplanar reconstructions were performed. 3-D reconstructions off a  remote workstation for CT angiography were also acquired. Carotid  stenoses were evaluated by comparing the caliber of the proximal  internal carotid artery to the caliber of the distal internal carotid  artery. Radiation dose for this scan was reduced using automated  exposure control, adjustment of the mA and/or kV according to patient  size, or iterative reconstruction technique.    FINDINGS:    Brachiocephalic vessels: Normal.    Right carotid system: Normal.    Left carotid system: There is a distal cervical internal carotid  artery dissection at the skull base with approximately 80-90%  stenosis.    Right  vertebral artery: Normal.    Left vertebral artery: Normal.    [place]: Normal.    Other findings: None.      Impression    IMPRESSION: Left distal cervical internal carotid artery dissection  with 80-90% short segment stenosis.    PERLITA RUBI MD   CT Head w/o Contrast    Narrative    CT SCAN OF THE HEAD WITHOUT CONTRAST December 25, 2018 10:31 PM     HISTORY: Headache, left ptosis, tongue deviation.    TECHNIQUE: Axial images of the head and coronal reformations without  IV contrast material. Radiation dose for this scan was reduced using  automated exposure control, adjustment of the mA and/or kV according  to patient size, or iterative reconstruction technique.    COMPARISON: None.    FINDINGS: The ventricles are normal in size, shape and configuration.  The brain parenchyma and subarachnoid spaces are normal. There is no  evidence of intracranial hemorrhage, mass, acute infarct or anomaly.     The visualized portions of the sinuses and mastoids appear normal.  There is no evidence of trauma.       Impression    IMPRESSION: Normal CT scan of the head.     PERLITA RUBI MD

## 2018-12-26 NOTE — PLAN OF CARE
Pt A&O x4. VSS on ra. Pt denies headache. Regular diet. UP with SBA. LS clear. Neuro intact. BS active. Will continue to monitor.

## 2018-12-27 VITALS
RESPIRATION RATE: 16 BRPM | OXYGEN SATURATION: 96 % | WEIGHT: 194.6 LBS | TEMPERATURE: 99.1 F | DIASTOLIC BLOOD PRESSURE: 99 MMHG | SYSTOLIC BLOOD PRESSURE: 135 MMHG | HEART RATE: 86 BPM

## 2018-12-27 PROCEDURE — 25000132 ZZH RX MED GY IP 250 OP 250 PS 637: Performed by: INTERNAL MEDICINE

## 2018-12-27 PROCEDURE — 99232 SBSQ HOSP IP/OBS MODERATE 35: CPT | Performed by: PSYCHIATRY & NEUROLOGY

## 2018-12-27 PROCEDURE — 25000128 H RX IP 250 OP 636: Performed by: INTERNAL MEDICINE

## 2018-12-27 PROCEDURE — 99238 HOSP IP/OBS DSCHRG MGMT 30/<: CPT | Performed by: INTERNAL MEDICINE

## 2018-12-27 RX ORDER — CLOPIDOGREL BISULFATE 75 MG/1
75 TABLET ORAL DAILY
Qty: 30 TABLET | Refills: 5 | Status: SHIPPED | OUTPATIENT
Start: 2018-12-27 | End: 2019-01-26

## 2018-12-27 RX ORDER — ASPIRIN 325 MG
325 TABLET ORAL DAILY
Qty: 30 TABLET | Refills: 5 | Status: SHIPPED | OUTPATIENT
Start: 2018-12-27 | End: 2018-12-27

## 2018-12-27 RX ORDER — ASPIRIN 325 MG
325 TABLET ORAL DAILY
Qty: 30 TABLET | Refills: 5 | Status: SHIPPED | OUTPATIENT
Start: 2018-12-27 | End: 2019-01-26

## 2018-12-27 RX ADMIN — LEVOTHYROXINE SODIUM 175 MCG: 100 TABLET ORAL at 07:58

## 2018-12-27 RX ADMIN — ASPIRIN 325 MG ORAL TABLET 325 MG: 325 PILL ORAL at 07:58

## 2018-12-27 RX ADMIN — HYDROCHLOROTHIAZIDE 12.5 MG: 12.5 CAPSULE ORAL at 07:58

## 2018-12-27 RX ADMIN — BUPROPION HYDROCHLORIDE 100 MG: 100 TABLET, FILM COATED, EXTENDED RELEASE ORAL at 07:58

## 2018-12-27 RX ADMIN — CLOPIDOGREL BISULFATE 75 MG: 75 TABLET, FILM COATED ORAL at 07:57

## 2018-12-27 RX ADMIN — SODIUM CHLORIDE: 9 INJECTION, SOLUTION INTRAVENOUS at 05:56

## 2018-12-27 RX ADMIN — ACETAMINOPHEN 650 MG: 325 TABLET, FILM COATED ORAL at 07:57

## 2018-12-27 ASSESSMENT — ACTIVITIES OF DAILY LIVING (ADL)
ADLS_ACUITY_SCORE: 8

## 2018-12-27 NOTE — PROGRESS NOTES
Vascular Neurology Progress Note    ____________________________________________________________    Admission Summary:  Ms. Davis is a 38 year old woman who presents with left eye ptosis and headache. She was found to have severe left ICA cervical-petrous segment dissection.    12/27 Left eye ptosis and miosis improved.    Last 24 hours:      As above.    Impression:    Left ICA dissection, likely traumatic     Recommendations:     -Continue on Aspirin and Plavix  -Will need CTA head in 1 month for follow up and determine need for anti-platelet therapy  -She will be flying soon to [place] and I think it would be ok for her to fly; She will need to make sure she follows up with Neurologist or follow up electronically through us  -Discharge home    Stroke Education provided including signs/symptoms of a stroke and the importance of timely treatment.    Please contact the stroke service with any questions: Feel free to call my cellphone.    Rafael Cole MD  Vascular Neurology  December 27, 2018    I personally reviewed all relevant labs and neuroimaging.  I spent 35 minutes reviewing labs, diagnostic studies, neuroimaging, and evaluating the patient.    ____________________________________________________________________        Medications:      Current Facility-Administered Medications:      acetaminophen (TYLENOL) tablet 650 mg, 650 mg, Oral, Q4H PRN, Reece Ramos MD, 650 mg at 12/27/18 0757     aspirin (ASA) tablet 325 mg, 325 mg, Oral, Daily, Reece Ramos MD, 325 mg at 12/27/18 0758     buPROPion (WELLBUTRIN SR) 12 hr tablet 100 mg, 100 mg, Oral, BID, Gray Hoover MD, 100 mg at 12/27/18 0758     clopidogrel (PLAVIX) tablet 75 mg, 75 mg, Oral, Daily, Reece Ramos MD, 75 mg at 12/27/18 0757     hydrALAZINE (APRESOLINE) injection 10 mg, 10 mg, Intravenous, Q4H PRN, Reece Ramos MD     hydrochlorothiazide (MICROZIDE) capsule 12.5 mg, 12.5 mg, Oral, Daily,  Reece Ramos MD, 12.5 mg at 12/27/18 0758     HYDROcodone-acetaminophen (NORCO) 5-325 MG per tablet 1-2 tablet, 1-2 tablet, Oral, Q4H PRN, Reece Ramos MD     levothyroxine (SYNTHROID/LEVOTHROID) tablet 175 mcg, 175 mcg, Oral, Once per day on Tue Thu Sat, Gray Hoover MD, 175 mcg at 12/27/18 0758     [START ON 12/28/2018] levothyroxine (SYNTHROID/LEVOTHROID) tablet 200 mcg, 200 mcg, Oral, Once per day on Sun Mon Wed Fri, Gray Hoover MD     LORazepam (ATIVAN) tablet 0.5-1 mg, 0.5-1 mg, Oral, Q4H PRN, Reece Ramos MD, 1 mg at 12/26/18 0421     magnesium hydroxide (MILK OF MAGNESIA) suspension 30 mL, 30 mL, Oral, Daily PRN, Reece Ramos MD     melatonin tablet 1 mg, 1 mg, Oral, At Bedtime PRN, Reece Ramos MD     naloxone (NARCAN) injection 0.1-0.4 mg, 0.1-0.4 mg, Intravenous, Q2 Min PRN, Reece Ramos MD     ondansetron (ZOFRAN-ODT) ODT tab 4 mg, 4 mg, Oral, Q6H PRN **OR** ondansetron (ZOFRAN) injection 4 mg, 4 mg, Intravenous, Q6H PRN, Reece Ramos MD     senna-docusate (SENOKOT-S/PERICOLACE) 8.6-50 MG per tablet 1 tablet, 1 tablet, Oral, BID PRN **OR** senna-docusate (SENOKOT-S/PERICOLACE) 8.6-50 MG per tablet 2 tablet, 2 tablet, Oral, BID PRN, Reece Ramos MD     sodium chloride 0.9% infusion, , Intravenous, Continuous, Reece Ramos MD, Stopped at 12/27/18 1030    Vital Signs:  B/P: 135/99, T: 99.1, P: 86, R: 16    BP (!) 135/99 (BP Location: Left arm)   Pulse 86   Temp 99.1  F (37.3  C) (Oral)   Resp 16   Wt 88.3 kg (194 lb 9.6 oz)   SpO2 96%   General: Awake and alert, not in any acute distress, cooperative  HEENT: Atraumatic, normocephalic, no scleral icterus or conjunctival pallor   Cardiac: RRR  Chest: Clear to auscultation  Abdomen: Soft, non-tender, non-distended  Extremities: No LE swelling.  Skin: No rash or lesion.   Psych: Normal mood and affect     Neuro:  Mental status: Awake,  alert, attentive, oriented x3. Speech is fluent, comprehension and repetition intact. No dysarthria.  Good historian.  No neglect.  Cranial nerves:  Pupils equal and reactive.  EOMI, visual fields full, face symmetric, facial sensation intact, shoulder shrug strong, palate rise symmetric, tongue/uvula midline, hearing intact to conversation.  Motor: Tone normal. 5/5 strength in all 4 extremities.  Reflexes: Symmetric  Sensory: Intact to light touch, temp.    Coordination: Finger nose finger intact bilaterally, no dysmetria, normal heel-shin test bilaterally    Labs/Studies:  CBC:     Recent Labs   Lab 12/25/18  2135   WBC 7.0   RBC 4.32   HGB 12.6   HCT 39.3        Basic Metabolic Panel:   Recent Labs   Lab Test 12/25/18  2135      POTASSIUM 3.8   CHLORIDE 111*   CO2 27   BUN 19   CR 0.85   GLC 94   PANDA 8.5     Liver panel:  No lab results found.  INR:No lab results found.   Lipid Profile:No lab results found.  A1C: No lab results found.  Troponin I: No lab results found.      Imaging:  Relevant findings as per the Impression above.

## 2018-12-27 NOTE — PLAN OF CARE
A&Ox4. Neuros intact, except L eye droop and R tongue deviation. VSS. Regular diet. Up Ind. C/o mild headache, managed w/ tylenol. Plan to discharge home w/ spouse. Imaging CD and records given to pt at discharge.

## 2018-12-27 NOTE — DISCHARGE SUMMARY
[facility]    Discharge Summary  Hospitalist    Date of Admission:  12/26/2018  Date of Discharge:  12/27/2018  Discharging Provider: Gray Hoover MD    Discharge Diagnoses     Spontaneous left internal carotid artery dissection  Hypertension  Hypothyroidism  Depression    Hospital Course   Juli Davis is a 38 year old female who presents with L sided headache and ptosis     Left internal carotid artery dissection  Presented after having several days of headache on L as well as ptosis. In ED imaging revealed L internal carotid artery dissection with 80-90% short segment stenosis. Neuro exam intact with exception of L eyelid ptosis  -MRI of the brain was normal  -Neurology consulted; recommended aspirin and Plavix for now, she will need follow-up CTA in 1 month to determine need for ongoing antiplatelet therapy.  -Patient has already explored options of getting a CTA of the head in 1 month at [facility].  -She will be flying [facility] soon, at this time I have given her prescription for aspirin and Plavix with 5 refills so that she can take with her in case if the dual antiplatelet therapy is extended beyond 1 month.     Hypertension  PTA on hydrochlorothiazide  -continue HCTZ     Hypothyroidism  Continue pta levothyroxine       # Discharge Pain Plan:   - Patient currently has NO PAIN and is not being prescribed pain medications on discharge.      Gray Hoover MD    Significant Results and Procedures   See below    Pending Results     Unresulted Labs Ordered in the Past 30 Days of this Admission     No orders found for last 61 day(s).          Code Status   Full Code       Primary Care Physician   Provider Not In System    Physical Exam   Temp: 98.4  F (36.9  C) Temp src: Oral BP: (!) 147/96   Heart Rate: 81 Resp: 16 SpO2: 97 % O2 Device: None (Room air)      Constitutional: AAOX3, NAD  Respiratory: CTA B/L, Normal WOB  Cardiovascular: RRR, No murmur  GI: Soft, Non- tender, BS-  normoactive  Skin/Integument: Warm and dry, no rashes  MSK: No joint deformity or swelling, no edema  Neuro: Minimal left ptosis.  No other focal deficit    Discharge Disposition   Discharged to home  Condition at discharge: Stable    Consultations This Hospital Stay   NEUROLOGY IP CONSULT    Time Spent on this Encounter   Gray CARMICHAEL, personally saw the patient today and spent less than or equal to 30 minutes discharging this patient.    Discharge Orders      Follow-up and recommended labs and tests    Follow up with primary care provider, Provider Not In System, within 7 days for hospital follow- up.    Neurology in 4 weeks. Needs repeat CTA of head in 4 weeks     Activity    Your activity upon discharge: activity as tolerated     Full Code     Diet    Follow this diet upon discharge: Orders Placed This Encounter      Regular Diet Adult       Discharge Medications   Current Discharge Medication List      START taking these medications    Details   aspirin (ASA) 325 MG tablet Take 1 tablet (325 mg) by mouth daily  Qty: 30 tablet, Refills: 5    Associated Diagnoses: Carotid artery dissection (H)      clopidogrel (PLAVIX) 75 MG tablet Take 1 tablet (75 mg) by mouth daily  Qty: 30 tablet, Refills: 5    Associated Diagnoses: Carotid artery dissection (H)         CONTINUE these medications which have NOT CHANGED    Details   buPROPion (WELLBUTRIN SR) 100 MG 12 hr tablet Take 100 mg by mouth 2 times daily      !! levothyroxine (SYNTHROID/LEVOTHROID) 175 MCG tablet Take 175 mcg by mouth three times a week Tu / Th / Sa      !! levothyroxine (SYNTHROID/LEVOTHROID) 200 MCG tablet Take 200 mcg by mouth four times a week Mon, Wed, Fri, [place]      hydrochlorothiazide (MICROZIDE) 12.5 MG capsule Take 12.5 mg by mouth every morning        !! - Potential duplicate medications found. Please discuss with provider.        Allergies   Allergies   Allergen Reactions     Cephalexin      Macrobid [Nitrofurantoin]      Penicillins       Data   Most Recent 3 CBC's:  Recent Labs   Lab Test 12/25/18 2135   WBC 7.0   HGB 12.6   MCV 91         Most Recent 3 BMP's:  Recent Labs   Lab Test 12/25/18 2135      POTASSIUM 3.8   CHLORIDE 111*   CO2 27   BUN 19   CR 0.85   ANIONGAP 5   PANDA 8.5   GLC 94     Most Recent 2 LFT's:No lab results found.  Most Recent INR's and Anticoagulation Dosing History:  Anticoagulation Dose History     There is no flowsheet data to display.        Most Recent 3 Troponin's:No lab results found.  Most Recent Cholesterol Panel:No lab results found.  Most Recent 6 Bacteria Isolates From Any Culture (See EPIC Reports for Culture Details):No lab results found.  Most Recent TSH, T4 and A1c Labs:No lab results found.    Results for orders placed or performed during the hospital encounter of 12/26/18   MR Brain w/o & w Contrast    Narrative    MRI BRAIN WITHOUT AND WITH CONTRAST  12/26/2018 5:43 AM    HISTORY:  Distal left cervical internal carotid artery dissection with  80-90% short segment stenosis just below the skull base. Sudden onset  of left-sided headache and left-sided ptosis.     TECHNIQUE:  Multiplanar, multisequence MRI of the brain without and  with 9 mL Gadavist.    COMPARISON: CT angiogram 12/25/2018.    FINDINGS:  The brain parenchyma, ventricles and subarachnoid spaces  appear normal.  There is no evidence of hemorrhage, mass, acute  infarct, or anomaly.  There are no gadolinium enhancing lesions.      The facial structures appear normal. A small intramural hematoma is  seen in the distal left internal carotid artery as it enters the skull  base corresponding with the stenosis on the CT angiogram, but the  vessel remains patent.      Impression    IMPRESSION:      1. Normal MRI of the brain.  2. Intramural hematoma from dissection of the distal cervical left  internal carotid artery as it enters the skull base.    JOSSELIN BRIGGS MD

## 2018-12-27 NOTE — CONSULTS
Pt to discharge today. Dr Cole will give contact number for her to send CD of Head CT that will be done in 4 weeks in [facility]. Patient given CD from [facility] of MRI and CT neck and head. She will also follow with her PCP in [place].

## 2018-12-27 NOTE — DISCHARGE INSTRUCTIONS
Please follow up with neurology in 4 weeks. You may call the Neurology Clinic in order to set up where to send follow-up CT results.    [facility]  [address] [place] [address] [phone]  [place] [address]  [phone]    ***Ask for your CT results to be read by Dr. Rafael Cole***    Neurology also recommends follow-up CT be completed in 4 Weeks

## 2018-12-27 NOTE — PROGRESS NOTES
"Upon discharging patient to car, patient stated she was \"dizzy\" and nervous to go home. After waiting several minutes and drinking apple juice, consuming liyah crackers, she stated she felt better.     She started crying and stated \"I think it's just been a few crappy days\".     Reinforced plan to return to ED or call triage if symptoms progress.     Patient safely escorted to car with .   "

## 2018-12-27 NOTE — PLAN OF CARE
A&Ox4. Neuros intact intact ex slight R tounge deviation. 2/10 headache present at 2300- resolved at 0400. VSS on RA. Regular diet. Up with SBA. Denies pain. Plan for discharge home.

## 2018-12-28 ENCOUNTER — APPOINTMENT (OUTPATIENT)
Dept: MRI IMAGING | Facility: CLINIC | Age: 38
End: 2018-12-28
Attending: EMERGENCY MEDICINE
Payer: COMMERCIAL

## 2018-12-28 ENCOUNTER — HOSPITAL ENCOUNTER (OUTPATIENT)
Facility: CLINIC | Age: 38
Setting detail: OBSERVATION
Discharge: HOME | End: 2018-12-29
Attending: EMERGENCY MEDICINE | Admitting: INTERNAL MEDICINE
Payer: COMMERCIAL

## 2018-12-28 DIAGNOSIS — H02.402 PTOSIS OF LEFT EYELID: ICD-10-CM

## 2018-12-28 DIAGNOSIS — I77.71 INTERNAL CAROTID ARTERY DISSECTION (H): ICD-10-CM

## 2018-12-28 PROCEDURE — 70553 MRI BRAIN STEM W/O & W/DYE: CPT

## 2018-12-28 PROCEDURE — 99218 ZZC INITIAL OBSERVATION CARE,LEVL I: CPT | Performed by: INTERNAL MEDICINE

## 2018-12-28 PROCEDURE — A9585 GADOBUTROL INJECTION: HCPCS | Performed by: EMERGENCY MEDICINE

## 2018-12-28 PROCEDURE — 99285 EMERGENCY DEPT VISIT HI MDM: CPT | Mod: 25

## 2018-12-28 PROCEDURE — 25500064 ZZH RX 255 OP 636: Performed by: EMERGENCY MEDICINE

## 2018-12-28 PROCEDURE — 25000128 H RX IP 250 OP 636

## 2018-12-28 PROCEDURE — 99207 ZZC CDG-HISTORY COMP: MEETS EXP. PROBLEM FOCUSED-DOWN CODED LACK OF ROS: CPT | Performed by: INTERNAL MEDICINE

## 2018-12-28 PROCEDURE — 96374 THER/PROPH/DIAG INJ IV PUSH: CPT

## 2018-12-28 PROCEDURE — 70549 MR ANGIOGRAPH NECK W/O&W/DYE: CPT

## 2018-12-28 RX ORDER — LORAZEPAM 2 MG/ML
1 INJECTION INTRAMUSCULAR ONCE
Status: COMPLETED | OUTPATIENT
Start: 2018-12-28 | End: 2018-12-28

## 2018-12-28 RX ORDER — CARBOXYMETHYLCELLULOSE SODIUM 5 MG/ML
1 SOLUTION/ DROPS OPHTHALMIC 3 TIMES DAILY PRN
Status: DISCONTINUED | OUTPATIENT
Start: 2018-12-28 | End: 2018-12-29 | Stop reason: HOSPADM

## 2018-12-28 RX ORDER — ACETAMINOPHEN 325 MG/1
650 TABLET ORAL EVERY 4 HOURS PRN
Status: DISCONTINUED | OUTPATIENT
Start: 2018-12-28 | End: 2018-12-29 | Stop reason: HOSPADM

## 2018-12-28 RX ORDER — LORAZEPAM 0.5 MG/1
.5-1 TABLET ORAL EVERY 4 HOURS PRN
Status: DISCONTINUED | OUTPATIENT
Start: 2018-12-28 | End: 2018-12-29 | Stop reason: HOSPADM

## 2018-12-28 RX ORDER — ACETAMINOPHEN 650 MG/1
650 SUPPOSITORY RECTAL EVERY 4 HOURS PRN
Status: DISCONTINUED | OUTPATIENT
Start: 2018-12-28 | End: 2018-12-29 | Stop reason: HOSPADM

## 2018-12-28 RX ORDER — LORAZEPAM 2 MG/ML
INJECTION INTRAMUSCULAR
Status: COMPLETED
Start: 2018-12-28 | End: 2018-12-28

## 2018-12-28 RX ORDER — GADOBUTROL 604.72 MG/ML
10 INJECTION INTRAVENOUS ONCE
Status: COMPLETED | OUTPATIENT
Start: 2018-12-28 | End: 2018-12-28

## 2018-12-28 RX ADMIN — GADOBUTROL 10 ML: 604.72 INJECTION INTRAVENOUS at 21:32

## 2018-12-28 RX ADMIN — LORAZEPAM 1 MG: 2 INJECTION INTRAMUSCULAR; INTRAVENOUS at 20:30

## 2018-12-28 RX ADMIN — LORAZEPAM 1 MG: 2 INJECTION INTRAMUSCULAR at 20:30

## 2018-12-28 ASSESSMENT — ENCOUNTER SYMPTOMS
SEIZURES: 0
NUMBNESS: 0
VOMITING: 0
NAUSEA: 0

## 2018-12-28 ASSESSMENT — MIFFLIN-ST. JEOR
SCORE: 1569
SCORE: 1541.78

## 2018-12-29 VITALS
HEART RATE: 86 BPM | TEMPERATURE: 98.3 F | SYSTOLIC BLOOD PRESSURE: 113 MMHG | HEIGHT: 67 IN | DIASTOLIC BLOOD PRESSURE: 77 MMHG | BODY MASS INDEX: 29.03 KG/M2 | OXYGEN SATURATION: 97 % | WEIGHT: 185 LBS | RESPIRATION RATE: 16 BRPM

## 2018-12-29 LAB
GLUCOSE BLDC GLUCOMTR-MCNC: 132 MG/DL (ref 70–99)
GLUCOSE BLDC GLUCOMTR-MCNC: 91 MG/DL (ref 70–99)

## 2018-12-29 PROCEDURE — 99217 ZZC OBSERVATION CARE DISCHARGE: CPT | Performed by: INTERNAL MEDICINE

## 2018-12-29 PROCEDURE — 25000132 ZZH RX MED GY IP 250 OP 250 PS 637: Performed by: INTERNAL MEDICINE

## 2018-12-29 PROCEDURE — 00000146 ZZHCL STATISTIC GLUCOSE BY METER IP

## 2018-12-29 PROCEDURE — G0378 HOSPITAL OBSERVATION PER HR: HCPCS

## 2018-12-29 PROCEDURE — 99213 OFFICE O/P EST LOW 20 MIN: CPT | Performed by: PSYCHIATRY & NEUROLOGY

## 2018-12-29 RX ORDER — HYDROCHLOROTHIAZIDE 12.5 MG/1
12.5 CAPSULE ORAL EVERY MORNING
Status: DISCONTINUED | OUTPATIENT
Start: 2018-12-29 | End: 2018-12-29 | Stop reason: HOSPADM

## 2018-12-29 RX ORDER — LEVOTHYROXINE SODIUM 100 UG/1
200 TABLET ORAL
Status: DISCONTINUED | OUTPATIENT
Start: 2018-12-30 | End: 2018-12-29 | Stop reason: HOSPADM

## 2018-12-29 RX ORDER — BUPROPION HYDROCHLORIDE 100 MG/1
100 TABLET, EXTENDED RELEASE ORAL 2 TIMES DAILY
Status: DISCONTINUED | OUTPATIENT
Start: 2018-12-29 | End: 2018-12-29 | Stop reason: HOSPADM

## 2018-12-29 RX ORDER — AMOXICILLIN 250 MG
1-2 CAPSULE ORAL 2 TIMES DAILY
Status: DISCONTINUED | OUTPATIENT
Start: 2018-12-29 | End: 2018-12-29 | Stop reason: HOSPADM

## 2018-12-29 RX ORDER — NALOXONE HYDROCHLORIDE 0.4 MG/ML
.1-.4 INJECTION, SOLUTION INTRAMUSCULAR; INTRAVENOUS; SUBCUTANEOUS
Status: DISCONTINUED | OUTPATIENT
Start: 2018-12-29 | End: 2018-12-29 | Stop reason: HOSPADM

## 2018-12-29 RX ORDER — CLOPIDOGREL BISULFATE 75 MG/1
75 TABLET ORAL DAILY
Status: DISCONTINUED | OUTPATIENT
Start: 2018-12-29 | End: 2018-12-29 | Stop reason: HOSPADM

## 2018-12-29 RX ORDER — ASPIRIN 325 MG
325 TABLET ORAL DAILY
Status: DISCONTINUED | OUTPATIENT
Start: 2018-12-29 | End: 2018-12-29 | Stop reason: HOSPADM

## 2018-12-29 RX ADMIN — HYDROCHLOROTHIAZIDE 12.5 MG: 12.5 CAPSULE ORAL at 09:22

## 2018-12-29 RX ADMIN — BUPROPION HYDROCHLORIDE 100 MG: 100 TABLET, FILM COATED, EXTENDED RELEASE ORAL at 09:23

## 2018-12-29 RX ADMIN — CLOPIDOGREL BISULFATE 75 MG: 75 TABLET, FILM COATED ORAL at 09:23

## 2018-12-29 RX ADMIN — ASPIRIN 325 MG ORAL TABLET 325 MG: 325 PILL ORAL at 09:23

## 2018-12-29 RX ADMIN — ACETAMINOPHEN 650 MG: 325 TABLET, FILM COATED ORAL at 00:52

## 2018-12-29 RX ADMIN — ACETAMINOPHEN 650 MG: 325 TABLET, FILM COATED ORAL at 09:34

## 2018-12-29 NOTE — PLAN OF CARE
Observation goals PRIOR TO DISCHARGE     Comments: List all goals to be met before discharge home     Yes  Free from ACUTE neuro deficits   Not met  Testing complete   Not met  Other:   Nurse to notify provider when observation goals have been met and patient is ready for discharge  Yes

## 2018-12-29 NOTE — PHARMACY-ADMISSION MEDICATION HISTORY
Admission medication history interview status for the 12/28/2018  admission is complete. See EPIC admission navigator for prior to admission medications     Medication history source reliability:Good    Actions taken by pharmacist (provider contacted, etc):None     Additional medication history information not noted on PTA med list : Did not take evening dose of wellbutrin    Medication reconciliation/reorder completed by provider prior to medication history? Yes    Time spent in this activity: 10 minutes    Prior to Admission medications    Medication Sig Last Dose Taking? Auth Provider   aspirin (ASA) 325 MG tablet Take 1 tablet (325 mg) by mouth daily 12/28/2018 at Unknown time Yes Gray Hoover MD   buPROPion (WELLBUTRIN SR) 100 MG 12 hr tablet Take 100 mg by mouth 2 times daily 12/28/2018 at AM Yes Unknown, Entered By History   clopidogrel (PLAVIX) 75 MG tablet Take 1 tablet (75 mg) by mouth daily 12/28/2018 at Unknown time Yes Gray Hoover MD   hydrochlorothiazide (MICROZIDE) 12.5 MG capsule Take 12.5 mg by mouth every morning  12/28/2018 at Unknown time Yes Reported, Patient   levothyroxine (SYNTHROID/LEVOTHROID) 175 MCG tablet Take 175 mcg by mouth three times a week Tu / Th / Sa 12/27/2018 at Unknown time Yes Unknown, Entered By History   levothyroxine (SYNTHROID/LEVOTHROID) 200 MCG tablet Take 200 mcg by mouth four times a week Mon, Wed, Fri, [place] 12/28/2018 at Unknown time Yes Unknown, Entered By History     Belen Laguna, PharmD   [phone]

## 2018-12-29 NOTE — PLAN OF CARE
VSS. A/O. Tele NSR. Neuro seen and ok with discharge home. Will discuss with outpatient neuro about overseas trip. Up independently. C/O headache improved with Tylenol. Discharge instructions reviewed with patient and spouse. No new medications. All questions answered. Patient verbalizes understanding of discharge care.

## 2018-12-29 NOTE — ED PROVIDER NOTES
History     Chief Complaint:  Eyelid droop    HPI   Juli Davis is a 38 year old female with a recent left carotid artery dissection who presents with her  to the emergency room with concern for an eyelid droop. The patient reports that she presented to the emergency room with a headache and a left eyelid droop two days ago, and she was admitted to stroke neuro after imaging revealed a left carotid dissection. She was discharged yesterday after improvement of her symptoms and she was started on Plavix and aspirin. However, since that time she has noticed a return of her eyelid droop, and she is experiencing a left scalp tingling sensation which was not present during her previous presentation or hospital stay. She denies any current nausea, emesis, numbness, seizures, speech difficulty, or other symptoms. She states she does not have a neurologist. She notes that her eyelid is tender to touch, and she has a pressure sensation in that area.       Allergies:  Cephalexin  Macrobid  Penicillins    Medications:    Aspirin  Wellbutrin  Plavix  hydrochlorothiazide  Levothyroxine    Past Medical History:    Left carotid dissection    Past Surgical History:    ENT    Family History:    HTN    Social History:  The patient denies tobacco or alcohol use.    Review of Systems   Gastrointestinal: Negative for nausea and vomiting.   Neurological: Negative for seizures and numbness.        Left eyelid droop  Scalp tingling, left   All other systems reviewed and are negative.      Physical Exam     Patient Vitals for the past 24 hrs:   BP Temp Temp src Pulse Heart Rate Resp SpO2 Height Weight   12/28/18 2200 (!) 150/106 -- -- 82 -- -- 100 % -- --   12/28/18 2145 -- -- -- -- -- -- 98 % -- --   12/28/18 2130 (!) 153/113 -- -- 81 -- 18 98 % -- --   12/28/18 2015 -- -- -- 80 83 9 -- -- --   12/28/18 2000 -- -- -- 81 95 20 100 % -- --   12/28/18 1945 (!) 176/115 -- -- 83 93 23 98 % -- --   12/28/18 1932 (!) 173/129 98.5  " F (36.9  C) Oral 89 -- 16 98 % 1.702 m (5' 7\") 86.6 kg (191 lb)         Physical Exam    General: Resting on the gurney, appears comfortably. Left eye ptosis.   Head:  The scalp, face, and head appear normal  Mouth/Throat: Mucus membranes are moist  CV:  Regular rate    Normal S1 and S2  No pathological murmur   Resp:  Breath sounds clear and equal bilaterally    Non-labored, no retractions or accessory muscle use    No coarseness    No wheezing   GI:  Abdomen is soft, no rigidity    No tenderness to palpation  MS:  Normal motor assessment of all extremities.    Good capillary refill noted.      Skin:  No rash or lesions noted.  Neuro:   Speech is normal and fluent. No apparent deficit. Paraesthesias in left scalp. Other CN 2-12 intact. Strength and sensation intact X 4.   Psych: Awake. Alert.  Normal affect.      Appropriate interactions.    Emergency Department Course     Imaging:  Radiographic findings were communicated with the patient who voiced understanding of the findings.    MR Brain w/o and w contrast:    1. No evidence of acute ischemia or hemorrhage.  2. Nonocclusive dissection with intramural hematoma involving the  distal left cervical internal carotid artery, unchanged.    MRA Neck (carotids) w/o and w contrast:  Nonocclusive multifocal left internal carotid artery  dissection. Specifically, intimal flap/5 mm pseudoaneurysm is present  involving the proximal left internal carotid artery, and intramural  hematoma is present involving the distal cervical internal carotid  artery. No evidence of flow-limiting stenosis.    Interventions:    2030 Ativan 1 mg IV  2132 Gadavist 10 mL IV    Emergency Department Course:  Nursing notes and vitals reviewed. (1940) I performed an exam of the patient as documented above.     IV inserted. Medicine administered as documented above. Blood drawn. This was sent to the lab for further testing, results above.    The patient was sent for a brain MRI and neck MRA while in " the emergency department, findings above.    (1956) I consulted with Dr. Hale, stroke neurology, regarding the patient's history and presentation here in the emergency department.     (2005) Recheck and discuss care plan going forward.    (2027) I rechecked the patient and discussed the results of her workup thus far.     (2025)  I consulted with the hospitalist services. They are in agreement to accept the patient for admission.    Findings and plan explained to the Patient who consents to admission. Discussed the patient with hospitalist, who will admit the patient to a observation bed for further monitoring, evaluation, and treatment.      Impression & Plan      Medical Decision Making:  Juli Davis is a 38 year old female who presents the emergency department complaining of worsened left eye ptosis and paresthesias of left scalp.  Patient was recently seen in this hospital and was admitted for carotid dissection.  Her only symptom at that time was left eye ptosis.  Her symptoms had resolved at time of discharge yesterday however she noticed today that her symptoms had returned and that she now had paresthesias as well.  Given her constellation of symptoms I contacted neurology they recommended an MRI and observation MRI was obtained and did not show extension of her dissection.  The recommended blood pressure control with the systolic blood pressure of 180 observation for change in neurologic status and neuro consultation.  She is comfortable with this plan and is discussed with the hospitalist service will place her on observation for further management care.    Diagnosis:    ICD-10-CM    1. Ptosis of left eyelid H02.402    2. Internal carotid artery dissection (H) I77.71        Disposition:  Admitted to observation.     Scribe Disclosure:  I, Jose Mark, am serving as a scribe on 12/28/2018 at 10:30 PM to personally document services performed by Digna Fisher MD based on my observations and  the provider's statements to me.       Jose Mark  12/28/2018    EMERGENCY DEPARTMENT       Digna Fisher MD  01/02/19 1030

## 2018-12-29 NOTE — CONSULTS
[facility]      Stroke Consult Note    Reason for Consult:  Non-emergent consult request for left eye ptosis    HPI  Juli Davis is a 38 year old female who presents with increase in left eyelid drooping. She notes that last night she noted increase in ptosis of the left eyelid and left pupil being smaller as well. She did not have weakness, numbness, slurred speech. She was previously admitted few days ago with left ICA dissection (likely traumatic) with same presentation as above. It had transiently resolved. She is on Aspirin and Plavix.    Impression  Left ICA dissection    Recommendations  -Continue Aspirin and Plavix  -CTA head/neck follow up in 1 month  -Pt will decide on whether she will be delaying flight to [place]; I discussed that although risk of stroke is low, if she were to have a stroke during flight, treatment would be severely delayed  -Can be discharged home today    Please contact the Stroke Service with any questions.    Rafael Cole MD  Neurology  December 29, 2018    Text Page ([phone])    __________________________________________________    No past medical history on file.    Past Surgical History:   Procedure Laterality Date     NONSPECIFIC PROCEDURE      NASAL SURG AS A CHILD     NONSPECIFIC PROCEDURE      DENTAL SURG       Medications   Current Facility-Administered Medications   Medication     acetaminophen (TYLENOL) tablet 650 mg    Or     acetaminophen (TYLENOL) Suppository 650 mg     aspirin (ASA) tablet 325 mg     buPROPion (WELLBUTRIN SR) 12 hr tablet 100 mg     Carboxymethylcellulose Sod PF (REFRESH PLUS) 0.5 % ophthalmic solution 1 drop     clopidogrel (PLAVIX) tablet 75 mg     hydrochlorothiazide (MICROZIDE) capsule 12.5 mg     [START ON 12/30/2018] levothyroxine (SYNTHROID/LEVOTHROID) tablet 200 mcg     LORazepam (ATIVAN) tablet 0.5-1 mg     naloxone (NARCAN) injection 0.1-0.4 mg     oxyCODONE IR (ROXICODONE) half-tab 2.5-5 mg     senna-docusate  (SENOKOT-S/PERICOLACE) 8.6-50 MG per tablet 1-2 tablet       Medications Prior to Admission   Medication Sig Dispense Refill Last Dose     aspirin (ASA) 325 MG tablet Take 1 tablet (325 mg) by mouth daily 30 tablet 5 12/28/2018 at Unknown time     buPROPion (WELLBUTRIN SR) 100 MG 12 hr tablet Take 100 mg by mouth 2 times daily   12/28/2018 at AM     clopidogrel (PLAVIX) 75 MG tablet Take 1 tablet (75 mg) by mouth daily 30 tablet 5 12/28/2018 at Unknown time     hydrochlorothiazide (MICROZIDE) 12.5 MG capsule Take 12.5 mg by mouth every morning    12/28/2018 at Unknown time     levothyroxine (SYNTHROID/LEVOTHROID) 175 MCG tablet Take 175 mcg by mouth three times a week Tu / Th / Sa 12/27/2018 at Unknown time     levothyroxine (SYNTHROID/LEVOTHROID) 200 MCG tablet Take 200 mcg by mouth four times a week Mon, Wed, Fri, [place]   12/28/2018 at Unknown time       Allergies   Allergies   Allergen Reactions     Cephalexin      Macrobid [Nitrofurantoin]      Penicillins        Family History   Family History     Problem (# of Occurrences) Relation (Name,Age of Onset)    Allergies (1) Mother: to many foods    Hypertension (1) Father          Social History   Social History     Socioeconomic History     Marital status:      Spouse name: Not on file     Number of children: Not on file     Years of education: Not on file     Highest education level: Not on file   Social Needs     Financial resource strain: Not on file     Food insecurity - worry: Not on file     Food insecurity - inability: Not on file     Transportation needs - medical: Not on file     Transportation needs - non-medical: Not on file   Occupational History     Not on file   Tobacco Use     Smoking status: Never Smoker   Substance and Sexual Activity     Alcohol use: Yes     Comment: rare socially     Drug use: No     Sexual activity: Not on file   Other Topics Concern     Not on file   Social History Narrative     Not on file          ROS  The 10 point  Review of Systems is negative other than noted in the HPI or here.     PHYSICAL EXAMINATION  B/P:     113/77 (12/29/18 0809)    Heart Rate: 79 (12/29/18 0809)    Pulse: 86 (12/28/18 2349)   Resp:  16 (12/29/18 0809)  Temp: 98.3  F (36.8  C)   Oral (12/29/18 0809)    General:  patient lying in bed without any acute distress    HEENT:  normocephalic/atraumatic  Cardio:  RRR  Pulmonary:  no respiratory distress  Abdomen:  soft  Extremities:  no edema  Skin:  intact     Neurologic  Mental Status:  fully alert, attentive and oriented, follows commands, speech clear and fluent  Cranial Nerves:  visual fields intact, PERRL, EOMI with normal smooth pursuit, facial sensation intact and symmetric, facial movements symmetric, no dysarthria; left eye ptosis with miosis  Motor:  strength 5/5 throughout upper and lower extremities  Reflexes:  no clonus  Sensory:  intact/symmetric to light touch and pin prick throughout upper and lower extremities  Coordination:  FNF and HS intact without dysmetria  Station/Gait:  unable to test    Labs/Imaging  Labs and imaging were reviewed and used in developing plan; pertinent results included.  Data   CBC  WBC (10e9/L)   Date Value   12/25/2018 7.0   04/22/2003 6.0    RBC Count (10e12/L)   Date Value   12/25/2018 4.32   04/22/2003 4.77    Hemoglobin (g/dL)   Date Value   12/25/2018 12.6   04/22/2003 15.6      Hematocrit (%)   Date Value   12/25/2018 39.3   04/22/2003 45.1    Platelet Count (10e9/L)   Date Value   12/25/2018 355   04/22/2003 351         BMP  Sodium (mmol/L)   Date Value   12/25/2018 143   04/22/2003 143    Potassium (mmol/L)   Date Value   12/25/2018 3.8   04/22/2003 4.4    Chloride (mmol/L)   Date Value   12/25/2018 111 (H)   04/22/2003 106      Carbon Dioxide (mmol/L)   Date Value   12/25/2018 27   04/22/2003 28    Glucose (mg/dL)   Date Value   12/25/2018 94   04/22/2003 84    Urea Nitrogen (mg/dL)   Date Value   12/25/2018 19   04/22/2003 11      Creatinine (mg/dL)   Date  Value   12/25/2018 0.85   04/22/2003 0.9    Calcium (mg/dL)   Date Value   12/25/2018 8.5   04/22/2003 9.7         INR Troponin I A1C   No results found for: INR No results found for: TROPI No results found for: A1C     Liver Panel  Protein Total (g/dL)   Date Value   04/22/2003 8.0    Albumin (g/dL)   Date Value   04/22/2003 4.5    Bilirubin Total (mg/dL)   Date Value   04/22/2003 0.6      Alkaline Phosphatase (U/L)   Date Value   04/22/2003 55    AST (U/L)   Date Value   04/22/2003 28    ALT (U/L)   Date Value   04/22/2003 14      No results found for: BILIDIRECT       Lipid Profile  No results found for: CHOL No results found for: HDL No results found for: LDL   No results found for: TRIG No results found for: CHOLPATTIE           I have personally spent a total of 45 minutes providing care and consulting with this patient's medical providers today, with more than 50% of this time spent in consultation, coordination of care, and discussion with the patient and/or family regarding diagnostic results, prognosis, symptom management, risks and benefits of management options, and development of plan of care.     This was a non-emergent, non-tele stroke consult.

## 2018-12-29 NOTE — PLAN OF CARE
PRIMARY DIAGNOSIS: NEURO  OUTPATIENT/OBSERVATION GOALS TO BE MET BEFORE DISCHARGE:  1. Orthostatic performed: Yes:          Lying Orthostatic BP: 148/102         Sitting Orthostatic BP: 150/111         Standing Orthostatic BP: 137/105     2. Diagnostic testing complete & at baseline neurologic testing: No    3. Cleared by consultants (if involved): No    4. Interpretation of cardiac rhythm per telemetry tech: NSR    5. Tolerating adequate PO diet and medications: Yes    6. Return to near baseline physical activity or neurologic status: Yes    Discharge Planner Nurse   Safe discharge environment identified: Yes  Barriers to discharge: No       Entered by: Tamica Garzon 12/29/2018 12:14 PM     Please review provider order for any additional goals.   Nurse to notify provider when observation goals have been met and patient is ready for discharge.

## 2018-12-29 NOTE — H&P
[facility]  History and Physical  Hospitalist       Date of Admission:  12/28/2018    Assessment & Plan   Juli Davis is a 38 year old female with recent left carotid artery dissection, admitted 12/26-12/27 with headache and left eye ptosis, hypertension, hypothyroidism, depression who was admitted on 12/28/2018 for worsened left eye ptosis and left scalp tingling sensation.     Left eye ptosis and miosis  Left scalp tingling  Left Carotid Artery Dissection  No trauma or neck manipulation reported. MRA carotids showed: Nonocclusive multifocal left internal carotid artery dissection. Specifically, intimal flap/5 mm pseudoaneurysm is present involving the proximal left internal carotid artery, and intramural hematoma is present involving the distal cervical internal carotid artery. No evidence of flow-limiting stenosis. MRI of brain showed no acute ischemia or hemorrhage, non-occlusive dissection with intramural hematoma involving the left distal cervical internal carotid artery, unchanged from previous study on 12/26/18.  -Goal blood pressure <180 systolic  -Q4 hour neuro checks  -neurology consult tomorrow  -continue aspirin and plavix  -continue hydrochlorothiazide  -prn hydralazine  -prn acetaminophen, oxycodone  -defer any further imaging for now awaiting Neuro consult recs  -serious consideration must be given to postponing PhD research trip to [facility] for 1 month to ensure no change. She could potentially find a neurologist who would be able to arrange for a CT angiogram in [place] but if this were on a traveler insurance policy, could be a pre-existing condition which would potentially not be covered.     Depression  -continue PTA Wellbutrin    Hypothyroidism  -continue PTA synthroid    DVT prophylaxis: Pneumatic Compression Devices and Ambulate every shift  Code Status:  Full    Disposition: Expected discharge in 1 day after neurology consultation.    Rosmery Wilson MD  Text  Page    ~~~~~~~~~~~~~~~~~~~~~~~~~~~~~~~~~~~~~~~~~~~~~~~~~~~~~  Primary Care Physician   Provider Not In System    Chief Complaint   Left eyelid droop    History is obtained from the patient and medical records.    History of Present Illness   Juli Davis is a 38 year old female with hypertension, hypothyroidism, depression, recent hospitalization (12/26-12/27) for left eye ptosis, left carotid artery dissection who presents with left scalp sensation of tingling and worsening left eyelid droop.    Discussed with Dr. Fisher from the Emergency Department.     Past Medical History    I have reviewed this patient's medical history and updated it with pertinent information if needed.   Hypothyroidism  Depression  Left carotid artery dissection  Hypertension    Past Surgical History   I have reviewed this patient's surgical history and updated it with pertinent information if needed.  Past Surgical History:   Procedure Laterality Date     NONSPECIFIC PROCEDURE      NASAL SURG AS A CHILD     NONSPECIFIC PROCEDURE      DENTAL SURG       Prior to Admission Medications   Prior to Admission Medications   Prescriptions Last Dose Informant Patient Reported? Taking?   aspirin (ASA) 325 MG tablet   No No   Sig: Take 1 tablet (325 mg) by mouth daily   buPROPion (WELLBUTRIN SR) 100 MG 12 hr tablet   Yes No   Sig: Take 100 mg by mouth 2 times daily   clopidogrel (PLAVIX) 75 MG tablet   No No   Sig: Take 1 tablet (75 mg) by mouth daily   hydrochlorothiazide (MICROZIDE) 12.5 MG capsule   Yes No   Sig: Take 12.5 mg by mouth every morning    levothyroxine (SYNTHROID/LEVOTHROID) 175 MCG tablet   Yes No   Sig: Take 175 mcg by mouth three times a week Tu / Th / Sa   levothyroxine (SYNTHROID/LEVOTHROID) 200 MCG tablet   Yes No   Sig: Take 200 mcg by mouth four times a week Mon, Wed, Fri, [place]      Facility-Administered Medications: None     Allergies   Allergies   Allergen Reactions     Cephalexin      Macrobid [Nitrofurantoin]       Penicillins        Social History   I have reviewed this patient's social history and updated it with pertinent information if needed. Juli Davis  reports that  has never smoked. She does not have any smokeless tobacco history on file. She reports that she drinks alcohol. She reports that she does not use drugs.    Family History   I have reviewed this patient's family history and updated it with pertinent information if needed.   Family History   Problem Relation Age of Onset     Hypertension Father      Allergies Mother         to many foods       Review of Systems   The 10 point Review of Systems is negative other than noted in the HPI or here.    Physical Exam   Temp: 98.5  F (36.9  C) Temp src: Oral BP: (!) 150/106 Pulse: 82 Heart Rate: 83 Resp: 18 SpO2: 100 % O2 Device: None (Room air)    Vital Signs with Ranges  Temp:  [98.5  F (36.9  C)] 98.5  F (36.9  C)  Pulse:  [80-89] 82  Heart Rate:  [83-95] 83  Resp:  [9-23] 18  BP: (150-176)/(106-129) 150/106  SpO2:  [98 %-100 %] 100 %  191 lbs 0 oz    Constitutional: Alert, NAD, pleasant and interactive  Eyes: left eyelid droop, +erythema of left sclera, left pupil 2 mm and reactive, right pupil normal  HEENT: mmm, atraumatic  Respiratory: Lungs CTAB, no wheezes or crackles  Cardiovascular: RRR, no murmurs  no LE edema  GI: soft, non-tender, nondistended  Skin/Integument: warm, dry, no acute rashes  Lymph/Hematologic: no supra or infraclavicular lymphadenopathy, no petechiae   Genitourinary: not examined  Musc: RONQUILLO, normal limb strength x 4  Neuro: AOx3, no focal deficits, no tremors  Psych: not anxious, not confused      Data   Data reviewed today:  I personally reviewed no images or EKG's today.  Recent Labs   Lab 12/25/18  2135   WBC 7.0   HGB 12.6   MCV 91         POTASSIUM 3.8   CHLORIDE 111*   CO2 27   BUN 19   CR 0.85   ANIONGAP 5   PANDA 8.5   GLC 94       Imaging:  Recent Results (from the past 24 hour(s))   MR Brain w/o & w Contrast     Narrative    MRI BRAIN WITHOUT AND WITH CONTRAST  12/28/2018  9:31 PM    HISTORY: Stroke, follow-up.    TECHNIQUE: Multiplanar, multisequence MRI of the brain without and  with 10 mL Gadavist.    COMPARISON: MRI 12/26/2018    FINDINGS:  The cerebral hemispheres, brain stem, and cerebellum demonstrate  normal morphology and signal. No evidence of acute ischemia,  hemorrhage, mass, mass effect, or hydrocephalus. No abnormal  enhancement or diffusion restriction is identified. There is T1  hyperintensity and T2 hyperintensity in a crescent configuration  involving the distal left cervical internal carotid artery  corresponding with the prior MRI findings. The visualized calvarium,  skull base, paranasal sinuses, and extracranial soft tissues are  otherwise unremarkable.      Impression    IMPRESSION:    1. No evidence of acute ischemia or hemorrhage.  2. Nonocclusive dissection with intramural hematoma involving the  distal left cervical internal carotid artery, unchanged.   MRA Neck (Carotids) wo & w Contrast    Narrative    MRA NECK WITHOUT AND WITH CONTRAST  12/28/2018 9:33 PM     HISTORY: Headache, sudden, carotid/vertebral dissection suspected.    TECHNIQUE: 2D time-of-flight MR angiogram of the neck without contrast  and 3D MR angiogram of the neck with  10 mL Gadavist. Estimates of  carotid stenoses are made relative to the distal internal carotid  artery diameters except as noted.    COMPARISON: None.    FINDINGS:    Normal origin of the great vessels from the aortic arch.    Right carotid artery: The right common and internal carotid arteries  are patent. No significant stenosis.      Left carotid artery: There is irregularity with small intimal flap/5  mm pseudoaneurysm involving the proxima left cervical internal carotid  artery (series 10 image 47). More superiorly, a [place]-shaped T1  hyperintensity is present involving the posterior aspect of the distal  cervical left internal carotid artery over  approximately a 2.4 cm  segment consistent with dissection/intramural hematoma. There is  associated mild narrowing of the left internal carotid artery. The  more proximal internal carotid artery is patent.    Vertebral arteries: Vertebral arteries appear patent without evidence  of dissection. No significant stenosis.        Impression    IMPRESSION:  Nonocclusive multifocal left internal carotid artery  dissection. Specifically, intimal flap/5 mm pseudoaneurysm is present  involving the proximal left internal carotid artery, and intramural  hematoma is present involving the distal cervical internal carotid  artery. No evidence of flow-limiting stenosis.

## 2018-12-29 NOTE — PLAN OF CARE
PRIMARY DIAGNOSIS: NEURO  OUTPATIENT/OBSERVATION GOALS TO BE MET BEFORE DISCHARGE:  1. Orthostatic performed: Yes:          Lying Orthostatic BP: 148/102         Sitting Orthostatic BP: 150/111         Standing Orthostatic BP: 137/105     2. Diagnostic testing complete & at baseline neurologic testing: No    3. Cleared by consultants (if involved): No    4. Interpretation of cardiac rhythm per telemetry tech: NSR    5. Tolerating adequate PO diet and medications: Yes    6. Return to near baseline physical activity or neurologic status: Yes    Discharge Planner Nurse   Safe discharge environment identified: Yes  Barriers to discharge: No       Entered by: Tamica Garzon 12/29/2018 8:00 AM     Please review provider order for any additional goals.   Nurse to notify provider when observation goals have been met and patient is ready for discharge.

## 2018-12-29 NOTE — ED NOTES
"[facility]  ED Nurse Handoff Report    ED Chief complaint: Eye Problem (Recent carotid artery disection.  Having droopy eyelid and pressure to left eye. )      ED Diagnosis:   Final diagnoses:   None       Code Status: Full Code    Allergies:   Allergies   Allergen Reactions     Cephalexin      Macrobid [Nitrofurantoin]      Penicillins        Activity level - Baseline/Home:  Independent    Activity Level - Current:   Independent     Needed?: No    Isolation: No  Infection: Not Applicable  Bariatric?: No    Vital Signs:   Vitals:    12/28/18 2015 12/28/18 2130 12/28/18 2145 12/28/18 2200   BP:  (!) 153/113  (!) 150/106   Pulse: 80 81  82   Resp: 9 18     Temp:       TempSrc:       SpO2:  98% 98% 100%   Weight:       Height:           Cardiac Rhythm: ,        Pain level: 0-10 Pain Scale: 2    Is this patient confused?: No   Does this patient have a guardian?  No         If yes, is there guardianship documents in the Epic \"Code/ACP\" activity?  N/A         Guardian Notified?  N/A  [place] - Suicide Severity Rating Scale Completed?  Yes  If yes, what color did the patient score?  White    Patient Report: Initial Complaint: pt was seen on 12/25 and dx with Left distal cervical internal carotid artery dissection. She was released from hospital yesterday. Initially had symptoms of drooping of L eye and redness to eye. Today at 1500 started having these symptoms again. Became anxious and came to the ED.   Focused Assessment: drooping noted to L eye with redness to eye. Pt c/o pressure. Has been hypertensive since she arrived.   Tests Performed: MRI brain and neck.   Abnormal Results: MRI Results: IMPRESSION:  Nonocclusive multifocal left internal carotid artery  dissection. Specifically, intimal flap/5 mm pseudoaneurysm is present  involving the proximal left internal carotid artery, and intramural  hematoma is present involving the distal cervical internal carotid  artery. No evidence of " flow-limiting stenosis.  Treatments provided: none     Family Comments:  at bedside.     OBS brochure/video discussed/provided to patient/family: Yes              Name of person given brochure if not patient: n/a              Relationship to patient: n/a    ED Medications:   Medications   LORazepam (ATIVAN) injection 1 mg (1 mg Intravenous Given 12/28/18 2030)   gadobutrol (GADAVIST) injection 10 mL (10 mLs Intravenous Given 12/28/18 2132)       Drips infusing?:  No    For the majority of the shift this patient was Green.   Interventions performed were monitor, reposition, updated on plan.    Severe Sepsis OR Septic Shock Diagnosis Present: No    To be done/followed up on inpatient unit:  none    ED NURSE PHONE NUMBER: [phone]

## 2018-12-29 NOTE — PLAN OF CARE
"Speech therapy orders received for swallowing, chart reviewed. NIHSS: 0, pt passed dysphagia and multiple documentation of pt tolerating her diet without concern. Verbal conversation held with RN who confirmed same. Evaluation is not indicated at this time. Will \"complete\" speech therapy orders. Please re-consult if concerns arise.   "

## 2018-12-29 NOTE — DISCHARGE INSTRUCTIONS
Pt has cell phone, clothing and shoes.   staying and in room and keeping other items with him.  Declined offered to lock anything of values up.

## 2018-12-29 NOTE — PLAN OF CARE
A&O.  AVSS except midnight BP slight elevated.  0400 AVSS.  Tylenol given for headache with relief.  Tele SR.  .  NIH score within limit and neuros intact. Left eyelid with some drooping and eye slightly red.  Stroke handbook given to pt .  Pt requested to eat and drink upon arrival to floor.  Hospitalist aware.   Pt had no difficulty eating or drinking.  Neurology consult today.  Independent.   stayed overnight.

## 2018-12-29 NOTE — PROGRESS NOTES
.RECEIVING UNIT ED HANDOFF REVIEW    ED Nurse Handoff Report was reviewed by: Claudio Mtz on December 28, 2018 at 11:26 PM

## 2018-12-29 NOTE — DISCHARGE SUMMARY
[facility]    Discharge Summary  Hospitalist    Date of Admission:  12/28/2018  Date of Discharge:  12/29/2018  Discharging Provider: Jm Robbins MD    Discharge Diagnoses   Recent Spontaneous (possibly traumatic) left internal carotid artery dissection with Left ptosis.           History of Present Illness   Juli Davis is a 38 year old female with hypertension, hypothyroidism, depression, recent hospitalization (12/26-12/27) for left eye ptosis, left carotid artery dissection who presents with left scalp sensation of tingling and worsening left eyelid droop.          Hospital Course   Juli Davis was admitted on 12/28/2018.  The following problems were addressed during her hospitalization:    Active Problems:    Dissecting hemorrhage of left carotid artery (H)    Juli Davis is a 38 year old female with recent left carotid artery dissection, admitted 12/26-12/27 with headache and left eye ptosis, hypertension, hypothyroidism, depression who was admitted on 12/28/2018 for worsened left eye ptosis and left scalp tingling sensation.      Left eye ptosis and miosis  Left scalp tingling  Left Carotid Artery Dissection  No trauma or neck manipulation reported. MRA carotids showed: Nonocclusive multifocal left internal carotid artery dissection. Specifically, intimal flap/5 mm pseudoaneurysm is present involving the proximal left internal carotid artery, and intramural hematoma is present involving the distal cervical internal carotid artery. No evidence of flow-limiting stenosis. MRI of brain showed no acute ischemia or hemorrhage, non-occlusive dissection with intramural hematoma involving the left distal cervical internal carotid artery, unchanged from previous study on 12/26/18.  Pt had slightly ptosis off left eye and L eye sclera slightly injected but no eye symptoms. She had some pressure behind L eye. Nl optho exam.  Nl vitals during stay. Stable neuro exam. No  change in ptosis apparent. Remainder of neuro exam negative. Pt seen by neurology.  Per neurology MRA neck looks better from recent admission.  No new neurologic process at this time.     Plan at time of discharge;   -continue aspirin and plavix  -continue hydrochlorothiazide  - CTA head/neck with neurology follow up 1 month  - decision to travel to [place] in near future can be discussed at neurology follow up.     Depression  -continued on PTA Wellbutrin     Hypothyroidism  -continued one PTA synthroid     DVT prophylaxis: Pneumatic Compression Devices and Ambulated every shift while in hospital  Code Status:  Full     Disposition: discharge to home.         Jm Robbins MD    Significant Results and Procedures   See hospital course    Pending Results   none  Unresulted Labs Ordered in the Past 30 Days of this Admission     No orders found for last 61 day(s).          Code Status   Full Code       Primary Care Physician   Provider Not In System    Physical Exam   Temp: 98.3  F (36.8  C) Temp src: Oral BP: 113/77 Pulse: 86 Heart Rate: 79 Resp: 16 SpO2: 97 % O2 Device: None (Room air)    Vitals:    12/28/18 1932 12/28/18 2349   Weight: 86.6 kg (191 lb) 83.9 kg (185 lb)     Vital Signs with Ranges  Temp:  [97.1  F (36.2  C)-98.5  F (36.9  C)] 98.3  F (36.8  C)  Pulse:  [80-95] 86  Heart Rate:  [79-95] 79  Resp:  [9-23] 16  BP: (113-176)/() 113/77  SpO2:  [96 %-100 %] 97 %  I/O last 3 completed shifts:  In: 240 [P.O.:240]  Out: -     Constitutional: nad, appears comfortable  Eyes: perrla, slightly injected/red sclera of L eye (lateral aspect). Eomi, no purulence or discharge  ENT: nl op  Respiratory: cTAB  Cardiovascular: RRR no r/g/m  GI: soft, NT, Nd  Skin:no rash or edema  Musculoskeletal: no focal swelling or redness  Neurologic:   Nl speech and mentation  CN 2-12 grossly intact except mild L ptosis  ? Mild left miosis  Strength 5/5 extrem X 4  Face symmetric, tongue midline    Neuropsychiatric:  nl affect    Discharge Disposition   Discharged to home  Condition at discharge: Good    Consultations This Hospital Stay   SWALLOW EVAL SPEECH PATH AT BEDSIDE IP CONSULT  SMOKING CESSATION PROGRAM IP CONSULT  NEUROLOGY IP CONSULT    Time Spent on this Encounter   I, Jm Robbins, personally saw the patient today and spent less than or equal to 30 minutes discharging this patient.    Discharge Orders      Reason for your hospital stay    History of carotid dissection with left lid drooping (ptyosis)  No change. No stroke     Follow-up and recommended labs and tests     CT head and neck in 4 weeks with neurology follow up at that time  Continue your prior medications including your aspirin and plavix     Activity    Your activity upon discharge: activity as tolerated  Discuss trip to [facility] with neurologist in 4 weeks     Discharge Instructions    Continue aspirin and plavix  CT head and neck with neurology visit in 4 weeks     Full Code     Diet    Follow this diet upon discharge: Orders Placed This Encounter      Regular Diet Adult     Discharge Medications   Current Discharge Medication List      CONTINUE these medications which have NOT CHANGED    Details   aspirin (ASA) 325 MG tablet Take 1 tablet (325 mg) by mouth daily  Qty: 30 tablet, Refills: 5    Associated Diagnoses: Carotid artery dissection (H)      buPROPion (WELLBUTRIN SR) 100 MG 12 hr tablet Take 100 mg by mouth 2 times daily      clopidogrel (PLAVIX) 75 MG tablet Take 1 tablet (75 mg) by mouth daily  Qty: 30 tablet, Refills: 5    Associated Diagnoses: Carotid artery dissection (H)      hydrochlorothiazide (MICROZIDE) 12.5 MG capsule Take 12.5 mg by mouth every morning       !! levothyroxine (SYNTHROID/LEVOTHROID) 175 MCG tablet Take 175 mcg by mouth three times a week Tu / Th / Sa      !! levothyroxine (SYNTHROID/LEVOTHROID) 200 MCG tablet Take 200 mcg by mouth four times a week Mon, Wed, Fri, [place]       !! - Potential duplicate medications  found. Please discuss with provider.        Allergies   Allergies   Allergen Reactions     Cephalexin      Macrobid [Nitrofurantoin]      Penicillins      Data   Most Recent 3 CBC's:  Recent Labs   Lab Test 12/25/18 2135   WBC 7.0   HGB 12.6   MCV 91         Most Recent 3 BMP's:  Recent Labs   Lab Test 12/25/18 2135      POTASSIUM 3.8   CHLORIDE 111*   CO2 27   BUN 19   CR 0.85   ANIONGAP 5   PANDA 8.5   GLC 94     Most Recent 2 LFT's:No lab results found.  Most Recent INR's and Anticoagulation Dosing History:  Anticoagulation Dose History     There is no flowsheet data to display.        Most Recent 3 Troponin's:No lab results found.  Most Recent Cholesterol Panel:No lab results found.  Most Recent 6 Bacteria Isolates From Any Culture (See EPIC Reports for Culture Details):No lab results found.  Most Recent TSH, T4 and A1c Labs:No lab results found.

## 2019-01-25 ENCOUNTER — TRANSFERRED RECORDS (OUTPATIENT)
Dept: HEALTH INFORMATION MANAGEMENT | Facility: CLINIC | Age: 39
End: 2019-01-25

## 2019-02-08 ENCOUNTER — TELEPHONE (OUTPATIENT)
Dept: NEUROLOGY | Facility: CLINIC | Age: 39
End: 2019-02-08

## 2019-02-08 NOTE — TELEPHONE ENCOUNTER
[facility] Call Center    Phone Message    May a detailed message be left on voicemail: yes    Reason for Call: Other: Pt calling wondering if her CT scan that she had sent from [place] made it to Dr. Cole.  She got a confirmation that it was received and signed for but wanted to make sure it actually got to him directly. Pt can not be reached by phone as she is currently in [facility]. Please give her a status update via RxResults.     Action Taken: Message routed to:  [facility]

## 2019-02-13 ENCOUNTER — TELEPHONE (OUTPATIENT)
Dept: NEUROLOGY | Facility: CLINIC | Age: 39
End: 2019-02-13

## 2019-02-13 NOTE — TELEPHONE ENCOUNTER
[facility] Call Center    Phone Message    May a detailed message be left on voicemail: yes    Reason for Call: Other: Pt is calling and stated she sent Dr. Rafael Cole a copy of her CT via [facility] delivery and it was received last week.  Pt is in [place] right now and can only communicate through her My Chart, please have Dr. Cole reply to pt about this     Action Taken: Message routed to:  [facility] Neuro

## 2019-02-15 ENCOUNTER — HEALTH MAINTENANCE LETTER (OUTPATIENT)
Age: 39
End: 2019-02-15

## 2019-02-18 NOTE — TELEPHONE ENCOUNTER
[facility] Call Center    Phone Message    May a detailed message be left on voicemail: yes    Reason for Call: Other: Pt calling again re: CT. She is still waiting to hear if Dr. Cole got the CD she sent via [employer]. Please communicate via [email or web address] with a status update.    Action Taken: Message routed to:  [facility]  NEUROLOGY

## 2019-10-01 ENCOUNTER — HEALTH MAINTENANCE LETTER (OUTPATIENT)
Age: 39
End: 2019-10-01

## 2021-01-15 ENCOUNTER — HEALTH MAINTENANCE LETTER (OUTPATIENT)
Age: 41
End: 2021-01-15

## 2021-09-04 ENCOUNTER — HEALTH MAINTENANCE LETTER (OUTPATIENT)
Age: 41
End: 2021-09-04

## 2022-02-13 ENCOUNTER — HEALTH MAINTENANCE LETTER (OUTPATIENT)
Age: 42
End: 2022-02-13

## 2022-10-16 ENCOUNTER — HEALTH MAINTENANCE LETTER (OUTPATIENT)
Age: 42
End: 2022-10-16

## 2023-03-26 ENCOUNTER — HEALTH MAINTENANCE LETTER (OUTPATIENT)
Age: 43
End: 2023-03-26

## 2024-01-13 ENCOUNTER — HEALTH MAINTENANCE LETTER (OUTPATIENT)
Age: 44
End: 2024-01-13